# Patient Record
Sex: MALE | Race: OTHER
[De-identification: names, ages, dates, MRNs, and addresses within clinical notes are randomized per-mention and may not be internally consistent; named-entity substitution may affect disease eponyms.]

---

## 2017-05-11 ENCOUNTER — APPOINTMENT (OUTPATIENT)
Dept: OTOLARYNGOLOGY | Facility: CLINIC | Age: 47
End: 2017-05-11

## 2017-05-18 ENCOUNTER — APPOINTMENT (OUTPATIENT)
Dept: OTOLARYNGOLOGY | Facility: CLINIC | Age: 47
End: 2017-05-18

## 2017-05-22 ENCOUNTER — EMERGENCY (EMERGENCY)
Facility: HOSPITAL | Age: 47
LOS: 0 days | Discharge: HOME | End: 2017-05-22

## 2017-05-22 PROBLEM — Z00.00 ENCOUNTER FOR PREVENTIVE HEALTH EXAMINATION: Noted: 2017-05-22

## 2017-06-15 ENCOUNTER — APPOINTMENT (OUTPATIENT)
Dept: OTOLARYNGOLOGY | Facility: CLINIC | Age: 47
End: 2017-06-15

## 2017-06-15 VITALS — HEIGHT: 67 IN | BODY MASS INDEX: 25.9 KG/M2 | WEIGHT: 165 LBS

## 2017-06-15 DIAGNOSIS — Z78.9 OTHER SPECIFIED HEALTH STATUS: ICD-10-CM

## 2017-06-28 DIAGNOSIS — R19.7 DIARRHEA, UNSPECIFIED: ICD-10-CM

## 2018-02-25 ENCOUNTER — EMERGENCY (EMERGENCY)
Facility: HOSPITAL | Age: 48
LOS: 0 days | Discharge: HOME | End: 2018-02-25

## 2018-02-25 VITALS
SYSTOLIC BLOOD PRESSURE: 117 MMHG | OXYGEN SATURATION: 98 % | DIASTOLIC BLOOD PRESSURE: 61 MMHG | TEMPERATURE: 99 F | RESPIRATION RATE: 18 BRPM

## 2018-02-25 DIAGNOSIS — M25.562 PAIN IN LEFT KNEE: ICD-10-CM

## 2018-02-25 DIAGNOSIS — M25.531 PAIN IN RIGHT WRIST: ICD-10-CM

## 2018-02-25 DIAGNOSIS — G89.29 OTHER CHRONIC PAIN: ICD-10-CM

## 2018-02-25 DIAGNOSIS — M25.532 PAIN IN LEFT WRIST: ICD-10-CM

## 2018-02-25 NOTE — ED PROVIDER NOTE - OBJECTIVE STATEMENT
46yo male with no significant PMHx presents c/o bilateral wrist pain and L knee for months. 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 He also admits to L knee pain that has been progressively worsening over months and occasionally "gives out" on him while working. He states to similar pain years prior for which he was undergoing PT but has finished. He has had no further evaluation since. He has not tried any OTC meds.

## 2018-02-25 NOTE — ED PROVIDER NOTE - NS ED ROS FT
CONST: No fever, chills or bodyaches  CARD: No chest pain, palpitations  MS: see HPI  SKIN: No rashes  NEURO: No headache, dizziness, paresthesias or LOC

## 2018-02-25 NOTE — ED PROVIDER NOTE - MEDICAL DECISION MAKING DETAILS
I have discussed the discharge plan with the patient. The patient agrees with the plan, as discussed.  The patient understands Emergency Department diagnosis is a preliminary diagnosis often based on limited information and that the patient must adhere to the follow-up plan as discussed.  The patient understands that if the symptoms worsen or if prescribed medications do not have the desired/planned effect that the patient may return to the Emergency Department at any time for further evaluation and treatment.     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 Pain free in ED. L knee pain, also chronic, previously benefitted from PT however no longer goes. Recently knee has been giving out when working. Rec: NSAIDS, RICE after work and ortho f/u.

## 2018-02-25 NOTE — ED PROVIDER NOTE - PHYSICAL EXAMINATION
CONST: Well appearing in NAD  EYES: PERRL, EOMI, Sclera and conjunctiva clear.   ENT: No nasal discharge.  Oropharynx normal appearing, no erythema or exudates.   NECK: Non-tender, no meningeal signs  CARD: Normal S1 S2; Normal rate and rhythm  RESP: Equal BS B/L, No wheezes, rhonchi or rales. No distress  MS:  Negative bony tenderness for wrists. No swelling. Full flexion/extension. L knee with mild medial aspect tenderness. No laxity. FROM  SKIN: Warm, dry, no acute rashes. Good turgor  NEURO: A&Ox3, No focal deficits.

## 2018-03-07 ENCOUNTER — OUTPATIENT (OUTPATIENT)
Dept: OUTPATIENT SERVICES | Facility: HOSPITAL | Age: 48
LOS: 1 days | Discharge: HOME | End: 2018-03-07

## 2018-03-07 ENCOUNTER — APPOINTMENT (OUTPATIENT)
Dept: ORTHOPEDIC SURGERY | Facility: CLINIC | Age: 48
End: 2018-03-07

## 2018-03-22 DIAGNOSIS — M25.569 PAIN IN UNSPECIFIED KNEE: ICD-10-CM

## 2018-03-23 ENCOUNTER — OUTPATIENT (OUTPATIENT)
Dept: OUTPATIENT SERVICES | Facility: HOSPITAL | Age: 48
LOS: 1 days | Discharge: HOME | End: 2018-03-23

## 2018-04-11 ENCOUNTER — APPOINTMENT (OUTPATIENT)
Dept: INTERNAL MEDICINE | Facility: CLINIC | Age: 48
End: 2018-04-11

## 2018-04-23 ENCOUNTER — OUTPATIENT (OUTPATIENT)
Dept: OUTPATIENT SERVICES | Facility: HOSPITAL | Age: 48
LOS: 1 days | Discharge: HOME | End: 2018-04-23

## 2018-04-23 ENCOUNTER — APPOINTMENT (OUTPATIENT)
Dept: INTERNAL MEDICINE | Facility: CLINIC | Age: 48
End: 2018-04-23

## 2018-04-23 VITALS
HEIGHT: 67 IN | BODY MASS INDEX: 25.43 KG/M2 | SYSTOLIC BLOOD PRESSURE: 100 MMHG | DIASTOLIC BLOOD PRESSURE: 67 MMHG | WEIGHT: 162 LBS | HEART RATE: 76 BPM

## 2018-04-23 DIAGNOSIS — Z87.39 PERSONAL HISTORY OF OTHER DISEASES OF THE MUSCULOSKELETAL SYSTEM AND CONNECTIVE TISSUE: ICD-10-CM

## 2018-04-23 DIAGNOSIS — H92.03 OTALGIA, BILATERAL: ICD-10-CM

## 2018-04-23 DIAGNOSIS — M25.561 PAIN IN RIGHT KNEE: ICD-10-CM

## 2018-04-23 DIAGNOSIS — M25.562 PAIN IN RIGHT KNEE: ICD-10-CM

## 2018-04-23 DIAGNOSIS — H91.93 UNSPECIFIED HEARING LOSS, BILATERAL: ICD-10-CM

## 2018-04-26 DIAGNOSIS — Z23 ENCOUNTER FOR IMMUNIZATION: ICD-10-CM

## 2018-04-26 DIAGNOSIS — M25.562 PAIN IN LEFT KNEE: ICD-10-CM

## 2018-05-16 ENCOUNTER — APPOINTMENT (OUTPATIENT)
Dept: ORTHOPEDIC SURGERY | Facility: CLINIC | Age: 48
End: 2018-05-16

## 2018-06-06 DIAGNOSIS — M25.561 PAIN IN RIGHT KNEE: ICD-10-CM

## 2018-06-06 DIAGNOSIS — M15.0 PRIMARY GENERALIZED (OSTEO)ARTHRITIS: ICD-10-CM

## 2018-10-14 ENCOUNTER — EMERGENCY (EMERGENCY)
Facility: HOSPITAL | Age: 48
LOS: 0 days | Discharge: HOME | End: 2018-10-14
Admitting: PHYSICIAN ASSISTANT

## 2018-10-14 VITALS
OXYGEN SATURATION: 98 % | TEMPERATURE: 98 F | DIASTOLIC BLOOD PRESSURE: 75 MMHG | RESPIRATION RATE: 18 BRPM | SYSTOLIC BLOOD PRESSURE: 142 MMHG | HEART RATE: 72 BPM

## 2018-10-14 DIAGNOSIS — Z79.899 OTHER LONG TERM (CURRENT) DRUG THERAPY: ICD-10-CM

## 2018-10-14 DIAGNOSIS — S69.92XA UNSPECIFIED INJURY OF LEFT WRIST, HAND AND FINGER(S), INITIAL ENCOUNTER: ICD-10-CM

## 2018-10-14 DIAGNOSIS — M25.532 PAIN IN LEFT WRIST: ICD-10-CM
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
## 2018-10-14 RX ORDER — IBUPROFEN 200 MG
600 TABLET ORAL ONCE
Qty: 0 | Refills: 0 | Status: COMPLETED | OUTPATIENT
Start: 2018-10-14 | End: 2018-10-14

## 2018-10-14 RX ADMIN — Medication 600 MILLIGRAM(S): at 18:23

## 2018-10-14 NOTE — ED PROVIDER NOTE - MUSCULOSKELETAL, MLM
no obvious deformity to left wrist ; + tenderness along left distal radius with full ROM exhibited ; no tenderness along hand and elbow

## 2018-10-14 NOTE — ED ADULT NURSE NOTE - OBJECTIVE STATEMENT
patient c/o left wrist pain for one month, worse this past week. denies any trauma to the area. no redness/swelling noted

## 2018-10-14 NOTE — ED PROVIDER NOTE - OBJECTIVE STATEMENT
49 y/o M, no significant PMHx, presents to the ED with complaints of left wrist pain x one month. Patient was playing soccer one month ago when he fell on to his left wrist with resultant pain that has been intermittent. He denies associated swelling, decreased ROM, fever, chills, and additional injuries.

## 2018-10-15 ENCOUNTER — APPOINTMENT (OUTPATIENT)
Dept: INTERNAL MEDICINE | Facility: CLINIC | Age: 48
End: 2018-10-15

## 2018-10-18 ENCOUNTER — LABORATORY RESULT (OUTPATIENT)
Age: 48
End: 2018-10-18

## 2018-10-18 LAB
ALBUMIN SERPL ELPH-MCNC: 4.6 G/DL
ALP BLD-CCNC: 78 U/L
ALT SERPL-CCNC: 27 U/L
ANION GAP SERPL CALC-SCNC: 15 MMOL/L
AST SERPL-CCNC: 24 U/L
BASOPHILS # BLD AUTO: 0.03 K/UL
BASOPHILS NFR BLD AUTO: 0.6 %
BILIRUB SERPL-MCNC: 0.7 MG/DL
BUN SERPL-MCNC: 14 MG/DL
CALCIUM SERPL-MCNC: 9.1 MG/DL
CHLORIDE SERPL-SCNC: 101 MMOL/L
CHOLEST SERPL-MCNC: 240 MG/DL
CHOLEST/HDLC SERPL: 4.4 RATIO
CO2 SERPL-SCNC: 23 MMOL/L
CREAT SERPL-MCNC: 0.6 MG/DL
EOSINOPHIL # BLD AUTO: 0.22 K/UL
EOSINOPHIL NFR BLD AUTO: 4.4 %
GLUCOSE SERPL-MCNC: 94 MG/DL
HCT VFR BLD CALC: 45.2 %
HDLC SERPL-MCNC: 55 MG/DL
HGB BLD-MCNC: 15.4 G/DL
IMM GRANULOCYTES NFR BLD AUTO: 0.4 %
LDLC SERPL CALC-MCNC: 189 MG/DL
LYMPHOCYTES # BLD AUTO: 1.55 K/UL
LYMPHOCYTES NFR BLD AUTO: 31.2 %
MAN DIFF?: NORMAL
MCHC RBC-ENTMCNC: 29.4 PG
MCHC RBC-ENTMCNC: 34.1 G/DL
MCV RBC AUTO: 86.3 FL
MONOCYTES # BLD AUTO: 0.3 K/UL
MONOCYTES NFR BLD AUTO: 6 %
NEUTROPHILS # BLD AUTO: 2.85 K/UL
NEUTROPHILS NFR BLD AUTO: 57.4 %
PLATELET # BLD AUTO: 232 K/UL
POTASSIUM SERPL-SCNC: 4.3 MMOL/L
PROT SERPL-MCNC: 7.3 G/DL
RBC # BLD: 5.24 M/UL
RBC # FLD: 12.1 %
SODIUM SERPL-SCNC: 139 MMOL/L
TRIGL SERPL-MCNC: 114 MG/DL
WBC # FLD AUTO: 4.97 K/UL

## 2018-11-28 ENCOUNTER — APPOINTMENT (OUTPATIENT)
Dept: ORTHOPEDIC SURGERY | Facility: CLINIC | Age: 48
End: 2018-11-28

## 2018-11-28 ENCOUNTER — OUTPATIENT (OUTPATIENT)
Dept: OUTPATIENT SERVICES | Facility: HOSPITAL | Age: 48
LOS: 1 days | Discharge: HOME | End: 2018-11-28

## 2019-12-30 ENCOUNTER — EMERGENCY (EMERGENCY)
Facility: HOSPITAL | Age: 49
LOS: 0 days | Discharge: HOME | End: 2019-12-30
Attending: EMERGENCY MEDICINE | Admitting: EMERGENCY MEDICINE
Payer: SUBSIDIZED

## 2019-12-30 VITALS
HEART RATE: 85 BPM | TEMPERATURE: 99 F | OXYGEN SATURATION: 98 % | RESPIRATION RATE: 18 BRPM | DIASTOLIC BLOOD PRESSURE: 76 MMHG | SYSTOLIC BLOOD PRESSURE: 135 MMHG

## 2019-12-30 DIAGNOSIS — J06.9 ACUTE UPPER RESPIRATORY INFECTION, UNSPECIFIED: ICD-10-CM

## 2019-12-30 DIAGNOSIS — R05 COUGH: ICD-10-CM

## 2019-12-30 PROCEDURE — 99283 EMERGENCY DEPT VISIT LOW MDM: CPT

## 2019-12-30 PROCEDURE — 71046 X-RAY EXAM CHEST 2 VIEWS: CPT | Mod: 26

## 2019-12-30 NOTE — ED PROVIDER NOTE - PATIENT PORTAL LINK FT
You can access the FollowMyHealth Patient Portal offered by [facility] by registering at the following website: [email or web address]. By joining [employer]’s FollowMyHealth portal, you will also be able to view your health information using other applications (apps) compatible with our system.

## 2019-12-30 NOTE — ED PROVIDER NOTE - ATTENDING CONTRIBUTION TO CARE
Pt here for sore throat and cough.  Subjective fever.  No vomiting/diarhrea/myalgias.    Exam: normal pharynx, no LAD, CTAB, RRR, NAD  Plan: xr chest

## 2019-12-30 NOTE — ED PROVIDER NOTE - PHYSICAL EXAMINATION
CONSTITUTIONAL: Well-developed; well-nourished; in no acute distress.   SKIN: warm, dry  HEAD: Normocephalic; atraumatic.  EYES: no conjunctival injection.   ENT: No nasal discharge; airway clear.  NECK: Supple; non tender.  CARD: S1, S2 normal; no murmurs, gallops, or rubs. Regular rate and rhythm.   RESP: No wheezes, rales or rhonchi.  ABD: soft ntnd  EXT: Normal ROM.  No clubbing, cyanosis or edema.   LYMPH: No acute cervical adenopathy.  NEURO: Alert, oriented, grossly unremarkable  PSYCH: Cooperative, appropriate.

## 2019-12-30 NOTE — ED PROVIDER NOTE - OBJECTIVE STATEMENT
49y m no pmh presenting with URI symptoms x2 weeks. +SOB +cough +congestion +subjective fevers x1 week. No abdominal pain. No chest pain. Not a smoker. No leg swelling. No recent immobility/surgeries.

## 2019-12-30 NOTE — ED ADULT NURSE NOTE - NS ED NURSE DISCH DISPOSITION
[facility]    11/29/2017 10:31 AM    Max heart rate: 120 bpm: 68%    48 year old    Wt Readings from Last 1 Encounters:   11/29/17 108.9 kg      Ht Readings from Last 1 Encounters:   11/29/17 5' 5\" (1.651 m)       Patient Type: Outpatient    Cardiac Markers: Not Applicable    Reason for test: chest pain, atypical     Primary MD: Mary benitez   Surgeon:    Ordering MD:  Fabian Lobato   Other:     Cardiologist Performing Test: Fabian Lobato    --------------------------------------------------------------------------------------------------------------------  HISTORY OF: Smoker       PATIENT HAS HAD THE FOLLOWING IN THE LAST 24 HOURS:  medication .    Current Outpatient Prescriptions   Medication Sig Dispense Refill   • omeprazole (PRILOSEC) 40 MG capsule Take 1 capsule by mouth daily. 30 capsule 11   • varenicline (CHANTIX STARTING MONTH MIMI) 0.5 MG X 11 & 1 MG X 42 tablet Take medication as directed on package 53 tablet 0   • varenicline (CHANTIX CONTINUING MONTH MIMI) 1 MG tablet Take 1 tablet by mouth 2 times daily. 60 tablet 6   • Multiple Vitamins-Minerals (MULTI COMPLETE PO)      • Melatonin-Pyridoxine (MELATONEX PO) Take 5 mg by mouth.        No current facility-administered medications for this encounter.        ALLERGIES:   Allergen Reactions   • Penicillins Other (See Comments)     Yeast infection       MEETS CRITERIA FOR STRESS TEST: Yes         NOTES:     TYPE OF TEST: Myocardial Perfusion and Lexiscan    ABLE TO WALK: Yes     NEEDED: No  
Discharged

## 2019-12-30 NOTE — ED PROVIDER NOTE - NS ED ROS FT
Eyes:  No visual changes, eye pain or discharge.  ENMT:  No hearing changes, pain, no sore throat +runny nose  Cardiac:  No chest pain, SOB or edema. No chest pain with exertion.  Respiratory: +cough  GI:  No nausea, vomiting, diarrhea or abdominal pain.  :  No dysuria, frequency or burning.  MS:  +myalgia  Neuro:  No headache or weakness.  No LOC.  Skin:  No skin rash.   Endocrine: No history of thyroid disease or diabetes.

## 2020-03-05 ENCOUNTER — EMERGENCY (EMERGENCY)
Facility: HOSPITAL | Age: 50
LOS: 0 days | Discharge: HOME | End: 2020-03-05
Admitting: EMERGENCY MEDICINE
Payer: SUBSIDIZED

## 2020-03-05 VITALS
DIASTOLIC BLOOD PRESSURE: 65 MMHG | HEART RATE: 75 BPM | OXYGEN SATURATION: 99 % | RESPIRATION RATE: 20 BRPM | TEMPERATURE: 99 F | SYSTOLIC BLOOD PRESSURE: 129 MMHG

## 2020-03-05 VITALS
SYSTOLIC BLOOD PRESSURE: 121 MMHG | TEMPERATURE: 99 F | DIASTOLIC BLOOD PRESSURE: 60 MMHG | OXYGEN SATURATION: 99 % | RESPIRATION RATE: 18 BRPM

## 2020-03-05 DIAGNOSIS — J18.9 PNEUMONIA, UNSPECIFIED ORGANISM: ICD-10-CM

## 2020-03-05 DIAGNOSIS — R05 COUGH: ICD-10-CM

## 2020-03-05 PROCEDURE — 71046 X-RAY EXAM CHEST 2 VIEWS: CPT | Mod: 26

## 2020-03-05 PROCEDURE — 99284 EMERGENCY DEPT VISIT MOD MDM: CPT

## 2020-03-05 RX ORDER — KETOROLAC TROMETHAMINE 30 MG/ML
30 SYRINGE (ML) INJECTION ONCE
Refills: 0 | Status: DISCONTINUED | OUTPATIENT
Start: 2020-03-05 | End: 2020-03-05

## 2020-03-05 RX ORDER — AZITHROMYCIN 500 MG/1
1 TABLET, FILM COATED ORAL
Qty: 6 | Refills: 0
Start: 2020-03-05 | End: 2020-03-09

## 2020-03-05 RX ADMIN — Medication 30 MILLIGRAM(S): at 21:06

## 2020-03-05 NOTE — ED PROVIDER NOTE - PATIENT PORTAL LINK FT
You can access the FollowMyHealth Patient Portal offered by [facility] by registering at the following website: [email or web address]. By joining [employer]’s FollowMyHealth portal, you will also be able to view your health information using other applications (apps) compatible with our system.

## 2020-03-05 NOTE — ED PROVIDER NOTE - NS ED ROS FT
Constitutional: no chills, no recent weight loss, change in appetite   Eyes: no redness/discharge/pain/vision changes  ENT: no rhinorrhea/ear pain/sore throat  Cardiac: No chest pain, SOB or edema.  Respiratory: No respiratory distress  GI: No nausea, vomiting, diarrhea or abdominal pain.  : No dysuria, frequency, urgency or hematuria  MS: no pain to back or extremities, no loss of ROM, no weakness  Neuro: No headache or weakness. No LOC.  Skin: No skin rash.  Endocrine: No history of thyroid disease or diabetes.  Except as documented in the HPI, all other systems are negative.

## 2020-03-05 NOTE — ED PROVIDER NOTE - PROGRESS NOTE DETAILS
Risk of worsening pneumonia d/w pt.   Pt looks well.  Abx choice considered given pt's history. Speaking in full sentences.  Vitals noted, sepsis not supported.  Does not currently appear to need admission. Abx risk dw pt. Pt aware needs f/u with PCP. Pt aware needs ER if worse.

## 2020-03-05 NOTE — ED PROVIDER NOTE - NSFOLLOWUPCLINICS_GEN_ALL_ED_FT
[facility]  Medicine  [address], [place]   Phone: [phone]  Fax:   Follow Up Time:

## 2020-03-05 NOTE — ED PROVIDER NOTE - OBJECTIVE STATEMENT
pt c/o cough, fever, body aches for 3-4 days. cough productive of green sputum. denies sick contacts or recent travel. pt c/o cough, fever, body aches for 3-4 days. cough productive of green sputum. denies sick contacts or recent travel. Denies chill/HA/dizziness/chest pain/palpitation/sob/abd pain/n/v/d/ black stool/bloody stool/urinary sxs

## 2020-05-14 ENCOUNTER — EMERGENCY (EMERGENCY)
Facility: HOSPITAL | Age: 50
LOS: 0 days | Discharge: HOME | End: 2020-05-14
Attending: EMERGENCY MEDICINE | Admitting: EMERGENCY MEDICINE
Payer: SUBSIDIZED

## 2020-05-14 VITALS
OXYGEN SATURATION: 98 % | TEMPERATURE: 98 F | HEART RATE: 75 BPM | SYSTOLIC BLOOD PRESSURE: 142 MMHG | DIASTOLIC BLOOD PRESSURE: 60 MMHG | RESPIRATION RATE: 18 BRPM

## 2020-05-14 DIAGNOSIS — M25.562 PAIN IN LEFT KNEE: ICD-10-CM

## 2020-05-14 DIAGNOSIS — M25.569 PAIN IN UNSPECIFIED KNEE: ICD-10-CM

## 2020-05-14 PROCEDURE — 99283 EMERGENCY DEPT VISIT LOW MDM: CPT

## 2020-05-14 PROCEDURE — 73562 X-RAY EXAM OF KNEE 3: CPT | Mod: 26,LT

## 2020-05-14 NOTE — ED PROVIDER NOTE - PHYSICAL EXAMINATION
CONST: Well appearing in NAD  EYES: PERRL, EOMI, Sclera and conjunctiva clear.   CARD: Normal S1 S2; Normal rate and rhythm  RESP: Equal BS B/L, No wheezes, rhonchi or rales. No distress  MS: Tenderness to L medial knee, pain elicited upon flexion. No swelling or erythema.   SKIN: Warm, dry, no acute rashes. Good turgor  NEURO: A&Ox3, No focal deficits. Strength 5/5 with no sensory deficits. Steady gait CONST: Well appearing in NAD  EYES: PERRL, EOMI, Sclera and conjunctiva clear.   CARD: Normal S1 S2; Normal rate and rhythm  RESP: Equal BS B/L, No wheezes, rhonchi or rales. No distress  MS: Tenderness and mild swelling to L medial knee, pain elicited upon flexion. No warmth  or erythema.   SKIN: Warm, dry, no acute rashes. Good turgor  NEURO: A&Ox3, No focal deficits. Strength 5/5 with no sensory deficits. Steady gait

## 2020-05-14 NOTE — ED PROVIDER NOTE - PATIENT PORTAL LINK FT
You can access the FollowMyHealth Patient Portal offered by [facility] by registering at the following website: [email or web address]. By joining [employer]’s FollowMyHealth portal, you will also be able to view your health information using other applications (apps) compatible with our system.

## 2020-05-14 NOTE — ED PROVIDER NOTE - CARE PROVIDER_API CALL
Darvin Weber  ORTHOPAEDIC SURGERY  [address] [place] [address]  Phone: [phone]  Fax: [phone]  Follow Up Time:

## 2020-05-14 NOTE — ED PROVIDER NOTE - NS ED ROS FT
CONST: No fever, chills or bodyaches  CARD: No chest pain, palpitations  RESP: No SOB, cough  MS: L knee pain  SKIN: No rashes  NEURO: No headache, dizziness, paresthesias or LOC

## 2020-05-14 NOTE — ED PROVIDER NOTE - NSFOLLOWUPINSTRUCTIONS_ED_ALL_ED_FT
[facility] - General Information     Dolor de rodilla    LO QUE NECESITA SABER:    ¿Qué necesito saber acerca del dolor de rodilla?El dolor de rodilla puede empezar de forma repentina, o ser un problema a bernardino plazo. Puede sentir dolor en la parte lateral, delantera o trasera de la rodilla. Puede tener la rodilla rígida e hinchada. Puede oír sonidos de chasquido o sentir que la rodilla cede o se le bloquea al andar. Puede sentir dolor cuando se sienta, se pone de pie, camina o sube y baja escaleras.    ¿Qué aumenta mi riesgo de tener dolor de rodilla?    Obesidad      Un esguince o desgarro en los ligamentos o tendones      Marshal fractura de la pierna o dislocación de la rodilla      Uso excesivo de la rodilla      Osteoartritis, artritis reumatoide o gota      Marshal infección, tumor o quiste en la rodilla      Zapatos que no brindan soporte o entrenamiento sobre marshal superficie dura      Deportes que impliquen saltar o girar sobre la rodilla    ¿Cómo se diagnostica la causa del dolor de rodilla?Bo médico examinará bo rodilla y le preguntará sobre kathy síntomas. Informe a bo médico cuándo comenzó el dolor y qué estaba haciendo en el momento. Describa el dolor niecy dean, pulsátil o adolorido. Informe a bo médico sobre cualquier lesión o cirugía que haya tenido en la rodilla. Es posible que usted necesite alguno de los siguientes:     Marshal resonancia magnética (RM), tomografía computarizada (TC) o un ultrasonidopodrían mostrar imágenes de marshal lesión, fractura o tumor.      Los análisis de sangrepodrían usarse para medir la cantidad de inflamación en bo edwardo. Los exámenes también podrían mostrar signos de infección.      Marshal artroscopiaes un procedimiento para observar el interior de la articulación de bo rodilla con un artroscopio. Un artroscopio es un tubo flexible que tiene marshal sanjeev y marshal cámara en el extremo. Marshal artroscopia de rodilla por lo general se realiza para determinar la presencia de marshal enfermedad o daño en el interior de bo rodilla. Estos problemas se pueden arreglar analia el procedimiento.    ¿Cómo se trata el dolor de rodilla?El tratamiento dependerá de la causa del dolor. Es posible que usted necesite alguno de los siguientes:     Los VERITO,pueden disminuir la inflamación y el dolor o la fiebre. Hannah medicamento está disponible con o sin marshal receta médica. Los VERITO pueden causar sangrado estomacal o problemas renales en ciertas personas. Si usted juan un medicamento anticoagulante, siempre pregúntele a bo médico si los VERITO son seguros para usted. Siempre marci la etiqueta de hannah medicamento y siga las instrucciones.      Acetaminofénalivia el dolor y baja la fiebre. Está disponible sin receta médica. Pregunte la cantidad y la frecuencia con que debe tomarlos. Siga las indicaciones. Marci las etiquetas de todos los demás medicamentos que esté usando para saber si también contienen acetaminofén, o pregunte a bo médico o farmacéutico. El acetaminofén puede causar daño en el hígado cuando no se juan de forma correcta. No use más de 4 gramos (4000 miligramos) en total de acetaminofeno en un día.      Puede administrarsepodrían administrarse. Pregunte al médico cómo debe grisel hannah medicamento de forma walden. Algunos medicamentos recetados para el dolor contienen acetaminofén. No tome otros medicamentos que contengan acetaminofén sin consultarlo con bo médico. Demasiado acetaminofeno puede causar daño al hígado. Los medicamentos recetados para el dolor podrían causar estreñimiento. Pregunte a bo médico niecy prevenir o tratar estreñimiento.      Las inyecciones de esteroidespodrían administrarse en la rodilla. Los esteroides reducen la inflamación y el dolor.      La cirugíapuede usarse para algunas lesiones, niecy para la reparación del desgarro del ligamento pablo anterior (LCA).    ¿Qué puedo hacer para manejar mis síntomas?    Repose la rodilla para que se pueda curar.Limite las actividades que aumenten el dolor. Rubens ejercicios de bajo impacto, niecy caminar o nadar.      Aplique hielo para ayudar a disminuir la inflamación y el dolor.Use marshal compresa de hielo o ponga hielo triturado en marshal bolsa de plástico. Cúbrala con marshal toalla antes de aplicarla sobre la herida. Aplique hielo analia 15 a 20 minutos por hora o según indicaciones.      Aplique compresión para ayudar a reducir la inflamación.Use marshal férula o venda solamente si sigue las instrucciones del nu.      Eleve la rodilla para ayudar a disminuir el dolor y la inflamación.Eleve la rodilla mientras esté sentado o acostado. Apoye la pierna sobre almohadones para mantener la rodilla por encima del corazón.      Evite que la rodilla se mueva, niecy se le haya indicado.Bo médico podría ponerle un yeso o férula. Usted podría necesitar de un yeso en bo pierna para estabilizar bo rodilla. El yeso en la pierna puede ajustarse para aumentar bo rango de movimiento a medida que bo rodilla harriet.Rodillera articulada         ¿Qué puedo hacer para prevenir el dolor de rodilla?    Mantenga un peso saludable.El exceso de peso aumenta bo riesgo de sufrir dolor de rodilla. Consulte con bo médico cuánto debería pesar. Él puede ayudarlo a crear un plan de pérdida de peso seguro si usted tiene sobrepeso.      Rubens ejercicio o entrene correctamente.Utilice los aparatos adecuados para los deportes. Use zapatos que brinden un buen soporte. Verifique bo postura a menudo mientras hace ejercicio, juega deportes o entrena para un evento. [place] puede ayudar a prevenir el estrés y la tensión en las rodillas. Descanse entre sesiones para no esforzar excesivamente las rodillas.    ¿Cuándo falguni buscar atención inmediata?    El dolor empeora, incluso después del tratamiento.      No puede flexionar o enderezar la pierna completamente.      La hinchazón alrededor de la rodilla no disminuye a pesar del tratamiento.      La rodilla le duele y se nota caliente al tacto.    ¿Cuándo falguni comunicarme con mi médico?    Usted tiene preguntas o inquietudes acerca de bo condición o cuidado.        ACUERDOS SOBRE BO CUIDADO:    Usted tiene el derecho de ayudar a planear bo cuidado. Aprenda todo lo que pueda sobre bo condición y niecy darle tratamiento. Discuta kathy opciones de tratamiento con kathy médicos para decidir el cuidado que usted desea recibir. Usted siempre tiene el derecho de rechazar el tratamiento.

## 2020-05-14 NOTE — ED PROVIDER NOTE - OBJECTIVE STATEMENT
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 Denies swelling, redness, warmth, fever, chills.

## 2020-12-29 ENCOUNTER — EMERGENCY (EMERGENCY)
Facility: HOSPITAL | Age: 50
LOS: 0 days | Discharge: HOME | End: 2020-12-29
Attending: STUDENT IN AN ORGANIZED HEALTH CARE EDUCATION/TRAINING PROGRAM | Admitting: STUDENT IN AN ORGANIZED HEALTH CARE EDUCATION/TRAINING PROGRAM
Payer: MEDICAID

## 2020-12-29 VITALS
TEMPERATURE: 99 F | RESPIRATION RATE: 18 BRPM | HEART RATE: 80 BPM | SYSTOLIC BLOOD PRESSURE: 118 MMHG | OXYGEN SATURATION: 98 % | DIASTOLIC BLOOD PRESSURE: 62 MMHG

## 2020-12-29 VITALS
DIASTOLIC BLOOD PRESSURE: 69 MMHG | HEART RATE: 75 BPM | OXYGEN SATURATION: 99 % | TEMPERATURE: 98 F | RESPIRATION RATE: 18 BRPM | SYSTOLIC BLOOD PRESSURE: 114 MMHG

## 2020-12-29 DIAGNOSIS — R09.89 OTHER SPECIFIED SYMPTOMS AND SIGNS INVOLVING THE CIRCULATORY AND RESPIRATORY SYSTEMS: ICD-10-CM

## 2020-12-29 DIAGNOSIS — Z20.828 CONTACT WITH AND (SUSPECTED) EXPOSURE TO OTHER VIRAL COMMUNICABLE DISEASES: ICD-10-CM

## 2020-12-29 DIAGNOSIS — J02.9 ACUTE PHARYNGITIS, UNSPECIFIED: ICD-10-CM

## 2020-12-29 DIAGNOSIS — Z79.899 OTHER LONG TERM (CURRENT) DRUG THERAPY: ICD-10-CM

## 2020-12-29 DIAGNOSIS — M79.10 MYALGIA, UNSPECIFIED SITE: ICD-10-CM

## 2020-12-29 DIAGNOSIS — R05 COUGH: ICD-10-CM

## 2020-12-29 PROCEDURE — 99284 EMERGENCY DEPT VISIT MOD MDM: CPT

## 2020-12-29 PROCEDURE — 71045 X-RAY EXAM CHEST 1 VIEW: CPT | Mod: 26

## 2020-12-29 RX ORDER — DEXAMETHASONE 0.5 MG/5ML
6 ELIXIR ORAL ONCE
Refills: 0 | Status: COMPLETED | OUTPATIENT
Start: 2020-12-29 | End: 2020-12-29

## 2020-12-29 RX ADMIN — Medication 6 MILLIGRAM(S): at 17:44

## 2020-12-29 NOTE — ED PROVIDER NOTE - CLINICAL SUMMARY MEDICAL DECISION MAKING FREE TEXT BOX
49 y/o M no PMHx presents here c/o x3 days of throat pain, HA, myalgias, cough, and subjective fevers. VS reviewed. labs imaging obtained and reviewed. No acute infiltrate, concern for possible covid 19 infection, PCR sent. Throat demonstrates no exudates, decadron provided for pain. Patient a spoken to in detail about results  All questions addressed. Return precautions given. Medicine clinic provided for follow up

## 2020-12-29 NOTE — ED PROVIDER NOTE - PHYSICAL EXAMINATION
CONSTITUTIONAL: Well-appearing; well-nourished; in no apparent distress.   ENT: + rhinorrhea; normal pharynx with no tonsillar hypertrophy.   NECK: Supple; non-tender; no cervical lymphadenopathy.   CARDIOVASCULAR: Normal S1, S2; no murmurs, rubs, or gallops.   RESPIRATORY: Normal chest excursion with respiration; breath sounds clear and equal bilaterally; no wheezes, rhonchi, or rales.  NEURO/PSYCH: A & O x 4; grossly unremarkable.

## 2020-12-29 NOTE — ED ADULT NURSE NOTE - CAS DISCH TRANSFER METHOD
well nourished/pt agreeable for nutrition focused physical exam, mild to moderate muscle loss around her temples, otherwise no other areas of muscle or fat loss at this time, pt overall thin legs Private car

## 2020-12-29 NOTE — ED PROVIDER NOTE - NSFOLLOWUPCLINICS_GEN_ALL_ED_FT
[facility]  Medicine  [address] [place], [place]   Phone: [phone]  Fax:   Follow Up Time:

## 2020-12-29 NOTE — ED PROVIDER NOTE - PROGRESS NOTE DETAILS
suspect covid19 infection; Pt looks well. Speaking in full sentences.  Vitals noted, sepsis not supported.  Does not currently appear to need admission. Pt aware needs f/u with PCP. Pt aware needs ER if worse. rev'd self quarantine instructions with pt Attending Note: 49 y/o M no PMHx presents here c/o x3 days of throat pain, HA, myalgias, cough, and subjective fevers. No n/v, abd pain, or SOB. Pt is a nonsmoker. PE: CONSTITUTIONAL: WA / WN / NAD. HEAD: NCAT. EYES: PERRL; EOMI; anicteric. ENT: Normal pharynx; mucous membranes pink/moist, no erythema. NECK: Supple; no meningeal signs CARD: RRR; nl S1/S2; no M/R/G. Pulses equal bilaterally. RESP: Respiratory rate and effort are normal; breath sounds clear and equal bilaterally. ABD: Soft, NT ND nl bowel sounds; no masses; no rebound. MSK/EXT: No gross deformities; full range of motion. SKIN: Warm and dry;  NEURO: AAOx3, PSYCH: Memory Intact, Normal Affect. Pt aware that we will have official radiology read pending, and may result in change of xray read.  Pt voices understanding of use of medications, instructions for care, and reasons to return or to go to ED Attending Note: 49 y/o M no PMHx presents here c/o x3 days of throat pain, HA, myalgias, cough, and subjective fevers. No n/v, abd pain, or SOB. Pt is a nonsmoker. PE: CONSTITUTIONAL: WA / WN / NAD. HEAD: NCAT. EYES: PERRL; EOMI; anicteric. ENT: Normal pharynx; mucous membranes pink/moist, no erythema no exudates. NECK: Supple; no meningeal signs CARD: RRR; nl S1/S2; no M/R/G. Pulses equal bilaterally. RESP: Respiratory rate and effort are normal; breath sounds clear and equal bilaterally. ABD: Soft, NT ND nl bowel sounds; no masses; no rebound. MSK/EXT: No gross deformities; full range of motion. SKIN: Warm and dry;  NEURO: AAOx3, PSYCH: Memory Intact, Normal Affect.

## 2020-12-29 NOTE — ED PROVIDER NOTE - NSFOLLOWUPINSTRUCTIONS_ED_ALL_ED_FT
[place] coronavirus (COVID-19)  Los hechos  ¿Qué es un coronavirus?  Los coronavirus son marshal gran genny de virus que causan enfermedades que van desde el resfriado común  a enfermedades más severas niecy el Síndrome Respiratorio del Medio Oriente (MERS) y el [place] Severo  Síndrome Respiratorio (SARS).  ¿Qué es el nuevo coronavirus (COVID-19)?  COVID-19 es marshal nueva cepa de coronavirus que no se ha identificado previamente en humanos. COVID-19  fue identificado en la ciudad de [facility], en diciembre de 2019 (COVID-19). COVID-19 tiene  desde que se identificó fuera de [place], en un número creciente de países a nivel internacional, incluidos  los [place].  ¿Dónde puedo encontrar la información más reciente sobre COVID-19?  Los Centros para el Control y la Prevención de Enfermedades ([facility]) están monitoreando de cerca el brote causado por el  COVID-19. Para obtener la información más reciente sobre COVID-19, visite el sitio web de los CDC en  https://www.cdc.gov/coronavirus/index.html  ¿Cómo se transmiten los coronavirus?  Los coronavirus pueden transmitirse de persona a persona, generalmente después de un contacto cercano con marshal persona infectada,  por ejemplo, en un hogar, lugar de trabajo o entorno de atención médica a través de gotas que se transportan al aire después de marshal tos  o estornudar por marshal persona afectada. Estas gotas pueden infectar a marshal persona cercana. Es probable que la transmisión también  ocurre al tocar superficies recientemente contaminadas.  ¿Cuáles son los síntomas de la infección por coronavirus?  Depende del virus, anny los signos comunes incluyen fiebre y / o síntomas respiratorios niecy  tos y dificultad para respirar En casos más severos, la infección puede causar neumonía, aguda aguda  síndrome respiratorio, insuficiencia renal e incluso la muerte. Afortunadamente, la mayoría de los casos de COVID-19 tienen un  enfermedad no diferente a la gripe "gripe". Con la mayoría de estos pacientes con síntomas leves  y mortalidad general que parece no ser muy diferente a la gripe.  ¿Existe un tratamiento para un COVID-19?  No existe un tratamiento específico para la enfermedad causada por COVID-19. Sin embargo, muchos de los síntomas pueden  ser tratado en función de la condición clínica del paciente. La atención de apoyo para personas infectadas puede ser altamente  eficaz.  ¿Qué puedo hacer para protegerme?  Lavarse las aditi, cubrirse la tos y desinfectar las superficies son las mejores precauciones.  medidas. También es aconsejable evitar el contacto cercano con cualquier persona que presente síntomas respiratorios.  enfermedades niecy tos y estornudos. Las personas con síntomas deben usar marshal máscara quirúrgica cuando  alrededor de otros.  ¿Qué puedo hacer para proteger a los que me rodean?  Si ha sido identificado niecy alguien que puede estar infectado con COVID-19, le recomendamos  siga los procedimientos de autoaislamiento que se detallan a continuación para proteger a quienes lo rodean y limitar la propagación  de kelly virus  3 de marzo de 2020  Recomendaciones para pacientes aconsejados para autoaislarse  para marshal posible exposición a COVID-19  Recomendamos los siguientes pasos de precaución desde ahora hasta los 14 angeles desde que  regresó de bo viaje o fecha de bo último contacto posible conocido:  - No vaya al trabajo, la escuela o las áreas públicas. Evite el uso de transporte público, viajes compartidos o  Taxis.  - En la medida de lo posible, separarse de otras personas en bo hogar. Si puedes, deberías  permanecer en marshal habitación y lejos de otras personas en bo hogar. Además, debe usar un  Baño, si está disponible.  - Use la máscara suministrada siempre que esté cerca de otras personas.  - Si tiene marshal zayra médica no urgente, reprograme para marshal fecha posterior. Si el  la zayra es urgente, llame al proveedor de atención médica y dígale que está en autoaislamiento por un posible COVID-19. [place] ayudará a que el consultorio del proveedor de atención médica tome medidas para mantener  otras personas se infecten o se expongan. Si puede reprogramar citas de rutina, rubens  entonces.  - Lávese las aditi con frecuencia con agua y jabón panchito al menos 15 a 20 segundos o lávese las aditi  con un desinfectante para aditi a base de alcohol que contiene 60 a 95% de alcohol, que cubre todas las superficies de  kathy aditi y frotándolas hasta que se sientan secas. Se debe usar agua y jabón  preferentemente si las aditi están visiblemente sucias.  - Cubra bo boca y nariz con un pañuelo cuando tosa o estornude. Tire los pañuelos usados ??en un  cubo de basura forrado; Lávate las aditi inmediatamente.  - Evite tocarse los ojos, la nariz y la boca con las aditi.  - Evite compartir artículos personales del hogar. No debe compartir platos, vasos, vasos,  comer utensilios, toallas o ropa de cama con otras personas o mascotas en bo hogar. Después de usar estos  artículos, se deben yolette a fondo con agua y jabón.  - Limpie y desinfecte todas las superficies de "alto contacto" todos los angeles. Las superficies de alto contacto incluyen mostradores,  tableros de rai, pomos de marta, interruptores de sanjeev, controles remotos, accesorios de baño, inodoros, teléfonos,  teclados, tabletas y mesitas de noche. Además, limpie cualquier superficie que pueda tener edwardo, heces o  fluidos corporales en ellos.    Si desarrolla síntomas que empeoran:  - Si desarrolla síntomas que empeoran, niecy falta de aire severa, isai al [phone] opción # 9. Le ayudarán a determinar kathy próximos pasos.  Panchito bo tiempo de autoaislamiento, rubens lo siguiente:  - Trabaja desde casa si puedes hacerlo.  - Limite el aislamiento social hablando con amigos y familiares por teléfono o con la frantz  - Hable con amigos y familiares que no viven con usted acerca de apoyarse mutuamente si rachel  el hogar debe estar en cuarentena. Por ejemplo, acepte dejar comestibles u otros suministros en el  yung principal.  - Rubens ejercicio y pase tiempo al aire kenneth lejos de los demás si puede hacerlo.    ¿Por qué no me hice la prueba del nuevo coronavirus (COVID-19)?  El número de pruebas disponibles es muy limitado, por lo que existen reglas estrictas para quién puede ser evaluado.  [facility] ha sido autorizado para realizar pruebas y actualmente está trabajando ruddy para ser  capaz de comenzar a proporcionar la prueba. Dichas pruebas están reservadas actualmente para pacientes que martinez tenido  contacto con alguien infectado con el virus, o aquellos que están muy enfermos, además de aquellos que tienen  viajó a áreas identificadas por los Centros para el Control y la Prevención de Enfermedades (CD) y  requieren hospitalización  ¿Qué debería hacer ahora?  Si está lo suficientemente reuben niecy para ser dado de roberta y no está en un kee de alto riesgo para tener  contraído el COVID-10, debe cuidarse en casa exactamente niecy lo haría si  tener gripe "gripe". Siga todas las pautas estándar sobre lavarse las aditi y cubrirse  bo tos, etc.  Debería regresar al departamento de emergencias si presenta síntomas peores, problemas  respiración, dolor en el pecho y / o fiebre que no mejora con el mostrador  acetaminofeno o ibuprofeno.

## 2020-12-29 NOTE — ED PROVIDER NOTE - OBJECTIVE STATEMENT
pt c/o sore throat, cough, runny nose, myalgias for a few days. Denies HA/dizziness/chest pain/palpitation/sob/abd pain/n/v/d/ black stool/bloody stool/urinary sxs

## 2020-12-29 NOTE — ED PROVIDER NOTE - ATTENDING CONTRIBUTION TO CARE
I personally evaluated the patient. I reviewed the Resident’s or Physician Assistant’s note (as assigned above), and agree with the findings and plan except as documented in my note.   see progress note

## 2020-12-29 NOTE — ED PROVIDER NOTE - PATIENT PORTAL LINK FT
You can access the FollowMyHealth Patient Portal offered by [facility] by registering at the following website: [email or web address]. By joining [employer]’s FollowMyHealth portal, you will also be able to view your health information using other applications (apps) compatible with our system.

## 2020-12-29 NOTE — ED PROVIDER NOTE - NS ED ROS FT
Constitutional: no recent weight loss, change in appetite   Eyes: no redness/discharge/pain/vision changes  ENT: no ear pain  Cardiac: No chest pain, SOB or edema.  Respiratory: No respiratory distress  GI: No nausea, vomiting, diarrhea or abdominal pain.  : No dysuria, frequency, urgency or hematuria  MS: no pain to back or extremities, no loss of ROM, no weakness  Neuro: No headache or weakness. No LOC.  Skin: No skin rash.  Endocrine: No history of thyroid disease or diabetes.  Except as documented in the HPI, all other systems are negative.

## 2020-12-30 LAB — SARS-COV-2 RNA SPEC QL NAA+PROBE: DETECTED

## 2021-02-23 ENCOUNTER — EMERGENCY (EMERGENCY)
Facility: HOSPITAL | Age: 51
LOS: 0 days | Discharge: HOME | End: 2021-02-23
Attending: EMERGENCY MEDICINE | Admitting: EMERGENCY MEDICINE
Payer: MEDICAID

## 2021-02-23 VITALS
RESPIRATION RATE: 18 BRPM | SYSTOLIC BLOOD PRESSURE: 135 MMHG | TEMPERATURE: 98 F | OXYGEN SATURATION: 98 % | DIASTOLIC BLOOD PRESSURE: 65 MMHG | HEART RATE: 77 BPM

## 2021-02-23 DIAGNOSIS — H01.001 UNSPECIFIED BLEPHARITIS RIGHT UPPER EYELID: ICD-10-CM

## 2021-02-23 DIAGNOSIS — Z79.899 OTHER LONG TERM (CURRENT) DRUG THERAPY: ICD-10-CM

## 2021-02-23 DIAGNOSIS — H57.10 OCULAR PAIN, UNSPECIFIED EYE: ICD-10-CM

## 2021-02-23 PROCEDURE — 99283 EMERGENCY DEPT VISIT LOW MDM: CPT

## 2021-02-23 RX ORDER — OFLOXACIN 0.3 %
1 DROPS OPHTHALMIC (EYE) ONCE
Refills: 0 | Status: DISCONTINUED | OUTPATIENT
Start: 2021-02-23 | End: 2021-02-23

## 2021-02-23 NOTE — ED PROVIDER NOTE - NSFOLLOWUPCLINICS_GEN_ALL_ED_FT
[facility]  Ophthalmolgy  [address], [address]  [place] [address]  Phone: [phone]  Fax:   Follow Up Time:

## 2021-02-23 NOTE — ED PROVIDER NOTE - PATIENT PORTAL LINK FT
You can access the FollowMyHealth Patient Portal offered by [facility] by registering at the following website: [email or web address]. By joining [employer]’s FollowMyHealth portal, you will also be able to view your health information using other applications (apps) compatible with our system.

## 2021-02-23 NOTE — ED PROVIDER NOTE - OBJECTIVE STATEMENT
51 y/o M w no pmh presents w r upper eye lid slight swelling and redness. No trauma (contrary to triage note). No fever. No pain w EOM. No vision changes.

## 2021-02-23 NOTE — ED PROVIDER NOTE - NSFOLLOWUPINSTRUCTIONS_ED_ALL_ED_FT
Blepharitis    Blepharitis is inflammation of the eyelids. Blepharitis may happen with:    Reddish, scaly skin around the scalp and eyebrows.  Burning or itching of the eyelids.  Eye discharge at night that causes the eyelashes to stick together in the morning.  Eyelashes that fall out.  Sensitivity to light.     HOME CARE INSTRUCTIONS  Pay attention to any changes in how you look or feel. Follow these instructions to help with your condition:    Keeping Clean    Wash your hands often.  Wash your eyelids with warm water or with warm water that is mixed with a small amount of baby shampoo. Do this two times per day or as often as needed.  Wash your face and eyebrows at least once a day.  Use a clean towel each time you dry your eyelids. Do not use this towel to clean or dry other areas of your body. Do not share your towel with anyone.    General Instructions    Avoid wearing makeup until you get better. Do not share makeup with anyone.  Avoid rubbing your eyes.  Apply warm compresses to your eyes 2 times per day for 10 minutes at a time, or as told by your health care provider.  If you were prescribed an antibiotic ointment or steroid drops, apply or use the medicine as told by your health care provider. Do not stop using the medicine even if you feel better.  Keep all follow-up visits as told by your health care provider. This is important.    SEEK MEDICAL CARE IF:  Your eyelids feel hot.  You have blisters or a rash on your eyelids.  The condition does not go away in 2–4 days.  The inflammation gets worse.    SEEK IMMEDIATE MEDICAL CARE IF:  You have pain or redness that gets worse or spreads to other parts of your face.  Your vision changes.  You have pain when looking at lights or moving objects.  You have a fever.    ADDITIONAL NOTES AND INSTRUCTIONS    Please follow up with your Primary MD in 24-48 hr.  Seek immediate medical care for any new/worsening signs or symptoms.

## 2021-02-23 NOTE — ED ADULT NURSE NOTE - CHIEF COMPLAINT
Patient discharged home via private vehicle. Patient verbally understands discharge instructions. Patient's IV out, and has all belongings.    The patient is a 50y Male complaining of eye pain/injury.

## 2021-02-23 NOTE — ED PROVIDER NOTE - PROGRESS NOTE DETAILS
Will start the patient on augmentin, ofloxacin drops and recommend optho clinic f/u within 2-3 days. Pt agreed w the plan. Full DC instructions discussed and patient knows when to seek immediate medical attention. Patient has proper follow-up. All results discussed with the patient they may require further work-up. Limitations of ED work-up discussed. All  questions and concerns from patient or family addressed. Understanding of insturctions verbalized

## 2021-03-04 ENCOUNTER — OUTPATIENT (OUTPATIENT)
Dept: OUTPATIENT SERVICES | Facility: HOSPITAL | Age: 51
LOS: 1 days | Discharge: HOME | End: 2021-03-04
Payer: MEDICAID

## 2021-03-04 ENCOUNTER — APPOINTMENT (OUTPATIENT)
Dept: OPHTHALMOLOGY | Facility: CLINIC | Age: 51
End: 2021-03-04

## 2021-03-04 PROCEDURE — 92134 CPTRZ OPH DX IMG PST SGM RTA: CPT | Mod: 26

## 2021-03-04 PROCEDURE — 92020 GONIOSCOPY: CPT

## 2021-03-04 PROCEDURE — 92014 COMPRE OPH EXAM EST PT 1/>: CPT

## 2021-03-09 ENCOUNTER — APPOINTMENT (OUTPATIENT)
Dept: OPHTHALMOLOGY | Facility: CLINIC | Age: 51
End: 2021-03-09

## 2021-03-09 ENCOUNTER — OUTPATIENT (OUTPATIENT)
Dept: OUTPATIENT SERVICES | Facility: HOSPITAL | Age: 51
LOS: 1 days | Discharge: HOME | End: 2021-03-09
Payer: SUBSIDIZED

## 2021-03-09 DIAGNOSIS — H25.13 AGE-RELATED NUCLEAR CATARACT, BILATERAL: ICD-10-CM

## 2021-03-09 DIAGNOSIS — H11.053 PERIPHERAL PTERYGIUM, PROGRESSIVE, BILATERAL: ICD-10-CM

## 2021-03-09 DIAGNOSIS — H35.363 DRUSEN (DEGENERATIVE) OF MACULA, BILATERAL: ICD-10-CM

## 2021-03-09 PROCEDURE — 92012 INTRM OPH EXAM EST PATIENT: CPT

## 2021-03-10 DIAGNOSIS — H11.053 PERIPHERAL PTERYGIUM, PROGRESSIVE, BILATERAL: ICD-10-CM

## 2021-03-10 DIAGNOSIS — H54.61 UNQUALIFIED VISUAL LOSS, RIGHT EYE, NORMAL VISION LEFT EYE: ICD-10-CM

## 2021-03-10 DIAGNOSIS — H35.363 DRUSEN (DEGENERATIVE) OF MACULA, BILATERAL: ICD-10-CM

## 2021-03-31 ENCOUNTER — OUTPATIENT (OUTPATIENT)
Dept: OUTPATIENT SERVICES | Facility: HOSPITAL | Age: 51
LOS: 1 days | Discharge: HOME | End: 2021-03-31
Payer: SUBSIDIZED

## 2021-03-31 ENCOUNTER — OUTPATIENT (OUTPATIENT)
Dept: OUTPATIENT SERVICES | Facility: HOSPITAL | Age: 51
LOS: 1 days | Discharge: HOME | End: 2021-03-31

## 2021-03-31 DIAGNOSIS — S63.501A UNSPECIFIED SPRAIN OF RIGHT WRIST, INITIAL ENCOUNTER: ICD-10-CM

## 2021-03-31 DIAGNOSIS — M54.5 LOW BACK PAIN: ICD-10-CM

## 2021-03-31 DIAGNOSIS — M77.10 LATERAL EPICONDYLITIS, UNSPECIFIED ELBOW: ICD-10-CM

## 2021-03-31 PROCEDURE — 73110 X-RAY EXAM OF WRIST: CPT | Mod: 26,RT

## 2021-03-31 PROCEDURE — 72110 X-RAY EXAM L-2 SPINE 4/>VWS: CPT | Mod: 26

## 2021-03-31 PROCEDURE — 73080 X-RAY EXAM OF ELBOW: CPT | Mod: 26,RT

## 2021-04-06 DIAGNOSIS — M54.5 LOW BACK PAIN: ICD-10-CM

## 2021-04-06 DIAGNOSIS — S63.501A UNSPECIFIED SPRAIN OF RIGHT WRIST, INITIAL ENCOUNTER: ICD-10-CM

## 2021-04-06 DIAGNOSIS — M77.10 LATERAL EPICONDYLITIS, UNSPECIFIED ELBOW: ICD-10-CM

## 2021-06-13 NOTE — ED ADULT NURSE NOTE - FINAL NURSING ELECTRONIC SIGNATURE
pt bib ems from [facility] fellowship for eval s/p fall from chair. pt unable to provide reliable hx due to dementia, but denies pain.  chronically ill female, nad, perrl, eomi, mmm, s1s2 rrr, lungs cta, ribs nt, abd soft, nt, no cvat, ext nt, full rom, neuro awake alert, confused, no focal motor or sensory deficits skin no abrasion or laceration 05-Mar-2020 22:12

## 2021-08-12 ENCOUNTER — OUTPATIENT (OUTPATIENT)
Dept: OUTPATIENT SERVICES | Facility: HOSPITAL | Age: 51
LOS: 1 days | Discharge: HOME | End: 2021-08-12

## 2021-08-12 DIAGNOSIS — M54.5 LOW BACK PAIN: ICD-10-CM

## 2021-08-12 DIAGNOSIS — M77.10 LATERAL EPICONDYLITIS, UNSPECIFIED ELBOW: ICD-10-CM

## 2021-09-14 ENCOUNTER — APPOINTMENT (OUTPATIENT)
Dept: OPHTHALMOLOGY | Facility: CLINIC | Age: 51
End: 2021-09-14

## 2021-09-14 ENCOUNTER — OUTPATIENT (OUTPATIENT)
Dept: OUTPATIENT SERVICES | Facility: HOSPITAL | Age: 51
LOS: 1 days | Discharge: HOME | End: 2021-09-14
Payer: SUBSIDIZED

## 2021-09-14 PROCEDURE — 92202 OPSCPY EXTND ON/MAC DRAW: CPT

## 2021-09-14 PROCEDURE — 92014 COMPRE OPH EXAM EST PT 1/>: CPT

## 2021-09-15 DIAGNOSIS — H11.053 PERIPHERAL PTERYGIUM, PROGRESSIVE, BILATERAL: ICD-10-CM

## 2021-09-15 DIAGNOSIS — H35.363 DRUSEN (DEGENERATIVE) OF MACULA, BILATERAL: ICD-10-CM

## 2021-09-15 DIAGNOSIS — H25.13 AGE-RELATED NUCLEAR CATARACT, BILATERAL: ICD-10-CM

## 2021-10-02 ENCOUNTER — EMERGENCY (EMERGENCY)
Facility: HOSPITAL | Age: 51
LOS: 0 days | Discharge: HOME | End: 2021-10-02
Attending: STUDENT IN AN ORGANIZED HEALTH CARE EDUCATION/TRAINING PROGRAM | Admitting: STUDENT IN AN ORGANIZED HEALTH CARE EDUCATION/TRAINING PROGRAM
Payer: MEDICAID

## 2021-10-02 VITALS
SYSTOLIC BLOOD PRESSURE: 126 MMHG | WEIGHT: 177.69 LBS | OXYGEN SATURATION: 95 % | TEMPERATURE: 97 F | RESPIRATION RATE: 17 BRPM | HEART RATE: 71 BPM | DIASTOLIC BLOOD PRESSURE: 65 MMHG
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
## 2021-10-02 DIAGNOSIS — S60.562A INSECT BITE (NONVENOMOUS) OF LEFT HAND, INITIAL ENCOUNTER: ICD-10-CM

## 2021-10-02 PROCEDURE — 99283 EMERGENCY DEPT VISIT LOW MDM: CPT

## 2021-10-02 RX ORDER — DIPHENHYDRAMINE HCL 50 MG
50 CAPSULE ORAL ONCE
Refills: 0 | Status: COMPLETED | OUTPATIENT
Start: 2021-10-02 | End: 2021-10-02

## 2021-10-02 RX ORDER — DEXAMETHASONE 0.5 MG/5ML
10 ELIXIR ORAL ONCE
Refills: 0 | Status: COMPLETED | OUTPATIENT
Start: 2021-10-02 | End: 2021-10-02

## 2021-10-02 RX ADMIN — Medication 10 MILLIGRAM(S): at 21:18

## 2021-10-02 RX ADMIN — Medication 50 MILLIGRAM(S): at 21:18

## 2021-10-02 NOTE — ED PROVIDER NOTE - ATTENDING CONTRIBUTION TO CARE
52 yo m denies pmh presents for eval of insect bite to L hand x1 day. pt states he was stung by a bee and had swelling and itching to area. no throat swelling/sob.  no joint pain/fever.    vss  gen- NAD, aaox3  card-rrr  lungs-ctab, no wheezing or rhonchi  neuro- full str/sensation, cn ii-xii grossly intact, normal coordination and gait  L hand- no focal swelling, no hives, no joint swelling, FROM to joints, radial pulse 2+ (exam after meds given)    eval c/w resolving local reaction to bee sting  unlikely cellulitis, no e/o FB  supportive care w/ steroid and antihistamine  outpt f/u

## 2021-10-02 NOTE — ED PROVIDER NOTE - OBJECTIVE STATEMENT
51 year old male with pmhx noted presents with insect bite to left hand x 1 day ago. pt admits was stugn by a bee, and noticed some mild swelling and itching to area today. no pain or fever.

## 2021-10-02 NOTE — ED ADULT NURSE NOTE - OBJECTIVE STATEMENT
pt states he was bite by "yellow bug" yesterday on left hand. endorses swelling starting today. no throat swelling

## 2021-10-02 NOTE — ED PROVIDER NOTE - NS ED ROS FT
Review of Systems:  	•	CONSTITUTIONAL - no fever, no diaphoresis, no chills  	•	SKIN - no rash  	•	HEMATOLOGIC - no bleeding, no bruising  	•	MUSCULOSKELETAL - no joint paint, + swelling, no redness  	•	NEUROLOGIC - no weakness, no paresthesias

## 2021-10-02 NOTE — ED PROVIDER NOTE - PATIENT PORTAL LINK FT
You can access the FollowMyHealth Patient Portal offered by [facility] by registering at the following website: [email or web address]. By joining [employer]’s FollowMyHealth portal, you will also be able to view your health information using other applications (apps) compatible with our system.

## 2021-10-02 NOTE — ED ADULT TRIAGE NOTE - CHIEF COMPLAINT QUOTE
Pt was bit on his left hand by a bee yesterday, today complaining of swelling to the hand. No throat swelling or difficulty swallowing or diff breathing.

## 2021-10-02 NOTE — ED ADULT NURSE NOTE - NSIMPLEMENTINTERV_GEN_ALL_ED
Implemented All Universal Safety Interventions:  [place] to call system. Call bell, personal items and telephone within reach. Instruct patient to call for assistance. Room bathroom lighting operational. Non-slip footwear when patient is off stretcher. Physically safe environment: no spills, clutter or unnecessary equipment. Stretcher in lowest position, wheels locked, appropriate side rails in place.

## 2021-11-23 ENCOUNTER — OUTPATIENT (OUTPATIENT)
Dept: OUTPATIENT SERVICES | Facility: HOSPITAL | Age: 51
LOS: 1 days | Discharge: HOME | End: 2021-11-23

## 2022-01-04 ENCOUNTER — OUTPATIENT (OUTPATIENT)
Dept: OUTPATIENT SERVICES | Facility: HOSPITAL | Age: 52
LOS: 1 days | Discharge: HOME | End: 2022-01-04

## 2022-02-02 ENCOUNTER — OUTPATIENT (OUTPATIENT)
Dept: OUTPATIENT SERVICES | Facility: HOSPITAL | Age: 52
LOS: 1 days | Discharge: HOME | End: 2022-02-02

## 2022-02-02 ENCOUNTER — APPOINTMENT (OUTPATIENT)
Dept: INTERNAL MEDICINE | Facility: CLINIC | Age: 52
End: 2022-02-02
Payer: COMMERCIAL

## 2022-02-02 ENCOUNTER — NON-APPOINTMENT (OUTPATIENT)
Age: 52
End: 2022-02-02

## 2022-02-02 VITALS
OXYGEN SATURATION: 97 % | SYSTOLIC BLOOD PRESSURE: 131 MMHG | BODY MASS INDEX: 28.56 KG/M2 | HEIGHT: 67 IN | WEIGHT: 182 LBS | HEART RATE: 73 BPM | DIASTOLIC BLOOD PRESSURE: 81 MMHG | TEMPERATURE: 98.1 F

## 2022-02-02 DIAGNOSIS — R63.5 ABNORMAL WEIGHT GAIN: ICD-10-CM

## 2022-02-02 PROCEDURE — 99213 OFFICE O/P EST LOW 20 MIN: CPT | Mod: GC

## 2022-02-02 RX ORDER — NAPROXEN 500 MG/1
500 TABLET ORAL
Refills: 0 | Status: DISCONTINUED | COMMUNITY
End: 2022-02-02

## 2022-02-02 RX ORDER — NAPROXEN 500 MG/1
500 TABLET ORAL
Qty: 60 | Refills: 1 | Status: DISCONTINUED | COMMUNITY
Start: 2018-04-23 | End: 2022-02-02

## 2022-02-02 NOTE — HISTORY OF PRESENT ILLNESS
[FreeTextEntry1] : weight gain of 20 kgs  [de-identified] : 51 y.o man with no previous medical hx is here for recent weight gain of 20 kgs over the last year\par he said that Dr jimenez told him that he has thyroid problem but no TSH was performed at that time\par he denies any cold intolerance, no fatigue, no dry skin, no hair loss, no constipation, no bradycardia and no depression \par he denies any polyuria, no polydipsia

## 2022-02-02 NOTE — PHYSICAL EXAM
[No Acute Distress] : no acute distress [Well Nourished] : well nourished [Well Developed] : well developed [Well-Appearing] : well-appearing [Thyroid Normal, No Nodules] : the thyroid was normal and there were no nodules present [No Respiratory Distress] : no respiratory distress  [No Accessory Muscle Use] : no accessory muscle use [Normal Rate] : normal rate  [Regular Rhythm] : with a regular rhythm [Normal] : normal rate, regular rhythm, normal S1 and S2 and no murmur heard [No Carotid Bruits] : no carotid bruits [No Edema] : there was no peripheral edema [Soft] : abdomen soft [Non Tender] : non-tender [Non-distended] : non-distended

## 2022-02-02 NOTE — REVIEW OF SYSTEMS
[Negative] : Respiratory [Fever] : no fever [Fatigue] : no fatigue [Night Sweats] : no night sweats [Chest Pain] : no chest pain [Palpitations] : no palpitations [Orthopena] : no orthopnea [Shortness Of Breath] : no shortness of breath [Wheezing] : no wheezing [Abdominal Pain] : no abdominal pain [Nausea] : no nausea [Vomiting] : no vomiting [Heartburn] : no heartburn

## 2022-02-02 NOTE — ASSESSMENT
[FreeTextEntry1] : 51 y.o man with no previous medical hx is here for recent weight gain of 20 kgs over the last year\par he said that Dr jimenez told him that he has thyroid problem but no TSH was performed at that time\par he denies any cold intolerance, no fatigue, no dry skin, no hair loss, no constipation, no bradycardia and no depression \par he denies any polyuria, no polydipsia\par \par # weight gain:\par - no signs of hypothyroidism on exam \par - check hba1c, tsh, cbc, lipid profile and cmp\par - non palpable thyroid , no signs of [place] sd, no mood disorders \par - refer to a dietician \par - denies any smoking cessation ( never smoker ) \par - his diet has been the same since few years\par \par # HCM:\par - patient wants to think about colonoscopy\par - f/u in 3 months and PRN after blood test results

## 2022-02-03 DIAGNOSIS — R63.5 ABNORMAL WEIGHT GAIN: ICD-10-CM

## 2022-02-03 LAB
ALBUMIN SERPL ELPH-MCNC: 4.7 G/DL
ALP BLD-CCNC: 95 U/L
ALT SERPL-CCNC: 38 U/L
ANION GAP SERPL CALC-SCNC: 14 MMOL/L
AST SERPL-CCNC: 27 U/L
BASOPHILS # BLD AUTO: 0.02 K/UL
BASOPHILS NFR BLD AUTO: 0.3 %
BILIRUB SERPL-MCNC: 0.6 MG/DL
BUN SERPL-MCNC: 15 MG/DL
CALCIUM SERPL-MCNC: 9.6 MG/DL
CHLORIDE SERPL-SCNC: 99 MMOL/L
CHOLEST SERPL-MCNC: 272 MG/DL
CO2 SERPL-SCNC: 24 MMOL/L
CREAT SERPL-MCNC: 0.8 MG/DL
EOSINOPHIL # BLD AUTO: 0.45 K/UL
EOSINOPHIL NFR BLD AUTO: 7.1 %
ESTIMATED AVERAGE GLUCOSE: 111 MG/DL
GLUCOSE SERPL-MCNC: 86 MG/DL
HBA1C MFR BLD HPLC: 5.5 %
HCT VFR BLD CALC: 44.8 %
HDLC SERPL-MCNC: 52 MG/DL
HGB BLD-MCNC: 14.9 G/DL
IMM GRANULOCYTES NFR BLD AUTO: 0.3 %
LDLC SERPL CALC-MCNC: 191 MG/DL
LYMPHOCYTES # BLD AUTO: 2.01 K/UL
LYMPHOCYTES NFR BLD AUTO: 31.7 %
MAN DIFF?: NORMAL
MCHC RBC-ENTMCNC: 29.7 PG
MCHC RBC-ENTMCNC: 33.3 G/DL
MCV RBC AUTO: 89.2 FL
MONOCYTES # BLD AUTO: 0.41 K/UL
MONOCYTES NFR BLD AUTO: 6.5 %
NEUTROPHILS # BLD AUTO: 3.44 K/UL
NEUTROPHILS NFR BLD AUTO: 54.1 %
NONHDLC SERPL-MCNC: 220 MG/DL
PLATELET # BLD AUTO: 215 K/UL
POTASSIUM SERPL-SCNC: 4.2 MMOL/L
PROT SERPL-MCNC: 7.4 G/DL
RBC # BLD: 5.02 M/UL
RBC # FLD: 12.2 %
SODIUM SERPL-SCNC: 137 MMOL/L
TRIGL SERPL-MCNC: 228 MG/DL
TSH SERPL-ACNC: 8.02 UIU/ML
WBC # FLD AUTO: 6.35 K/UL

## 2022-02-07 ENCOUNTER — NON-APPOINTMENT (OUTPATIENT)
Age: 52
End: 2022-02-07

## 2022-02-08 ENCOUNTER — NON-APPOINTMENT (OUTPATIENT)
Age: 52
End: 2022-02-08

## 2022-02-08 ENCOUNTER — OUTPATIENT (OUTPATIENT)
Dept: OUTPATIENT SERVICES | Facility: HOSPITAL | Age: 52
LOS: 1 days | Discharge: HOME | End: 2022-02-08

## 2022-02-08 ENCOUNTER — APPOINTMENT (OUTPATIENT)
Dept: NUTRITION | Facility: CLINIC | Age: 52
End: 2022-02-08

## 2022-02-15 DIAGNOSIS — Z71.3 DIETARY COUNSELING AND SURVEILLANCE: ICD-10-CM

## 2022-02-18 ENCOUNTER — EMERGENCY (EMERGENCY)
Facility: HOSPITAL | Age: 52
LOS: 0 days | Discharge: HOME | End: 2022-02-18
Attending: EMERGENCY MEDICINE | Admitting: EMERGENCY MEDICINE
Payer: MEDICAID

## 2022-02-18 VITALS
SYSTOLIC BLOOD PRESSURE: 136 MMHG | TEMPERATURE: 98 F | HEART RATE: 73 BPM | HEIGHT: 62.99 IN | WEIGHT: 176.37 LBS | RESPIRATION RATE: 18 BRPM | DIASTOLIC BLOOD PRESSURE: 72 MMHG | OXYGEN SATURATION: 97 %

## 2022-02-18 DIAGNOSIS — H92.02 OTALGIA, LEFT EAR: ICD-10-CM

## 2022-02-18 DIAGNOSIS — B34.9 VIRAL INFECTION, UNSPECIFIED: ICD-10-CM

## 2022-02-18 DIAGNOSIS — R07.0 PAIN IN THROAT: ICD-10-CM

## 2022-02-18 DIAGNOSIS — Z20.822 CONTACT WITH AND (SUSPECTED) EXPOSURE TO COVID-19: ICD-10-CM

## 2022-02-18 PROCEDURE — 99284 EMERGENCY DEPT VISIT MOD MDM: CPT

## 2022-02-18 RX ORDER — DEXAMETHASONE 0.5 MG/5ML
10 ELIXIR ORAL ONCE
Refills: 0 | Status: COMPLETED | OUTPATIENT
Start: 2022-02-18 | End: 2022-02-18

## 2022-02-18 RX ADMIN — Medication 10 MILLIGRAM(S): at 21:38

## 2022-02-18 NOTE — ED PROVIDER NOTE - PROGRESS NOTE DETAILS
steroid and abx given. Return precautions verbalized and understood. Pt will receive text with his covid rsults.

## 2022-02-18 NOTE — ED PROVIDER NOTE - PHYSICAL EXAMINATION
Physical Exam    Vital Signs: I have reviewed the initial vital signs.  Constitutional: well-nourished, appears stated age, no acute distress  Eyes: Conjunctiva pink, Sclera clear, PERRLA, EOMI, no ptosis, no entrapment, no racoon eyes.  Ears: No hemotypanum, TM intact, with cone of light visualized. Erythema of TM on L side, non bulging, no mastoid tenderness redness or swelling noted   mouth: + oropharyngeal injection, + edematous tonislls without exudates areas of fluctuance or induration   Cardiovascular: S1 and S2, regular rate, regular rhythm, well-perfused extremities, radial pulses equal and 2+, calves nonttp, equal in size  Respiratory: unlabored respiratory effort, speaking in full sentences, handling oral secretions ,clear to auscultation bilaterally no wheezing, rales and rhonchi  Musculoskeletal: supple neck no nucchal rigidity   Integumentary: warm, dry, no rashes, lacerations,  Neurologic: awake, alert

## 2022-02-18 NOTE — ED PROVIDER NOTE - ATTENDING CONTRIBUTION TO CARE
Patient is c/o sore throat and LT ear pain. Denies trauma. +pain on swallowing.   Vitals reviewed.   Patient is awake, alert, answering questions appropriately, appears comfortable and not in any distress.  Throat: B/L enlarged tonsils, +tonsilopharyngeal erythema, no exudates, +erythema and mild edema of the uvula, no pooling of the secretions noted.   supple neck, no lymphadenopathy.   Lungs: CTA, no wheezing, no crackles.  Abd: +BS, NT, ND, soft, no rebound, no guarding. No CVA tenderness, no rash.  CNS: awake, alert, o x 3, no focal neurologic deficits.  A/P: Tonsillitis,   abx, close outpatient follow up.

## 2022-02-18 NOTE — ED PROVIDER NOTE - NSFOLLOWUPINSTRUCTIONS_ED_ALL_ED_FT
TAKE YOUR MEDICATIONS AS DIRECTED.     Viral Illness, Adult  Viruses are tiny germs that can get into a person's body and cause illness. There are many different types of viruses, and they cause many types of illness. Viral illnesses can range from mild to severe. They can affect various parts of the body.    Common illnesses that are caused by a virus include colds and the flu. Viral illnesses also include serious conditions such as HIV/AIDS (human immunodeficiency virus/acquired immunodeficiency syndrome). A few viruses have been linked to certain cancers.    What are the causes?  Many types of viruses can cause illness. Viruses invade cells in your body, multiply, and cause the infected cells to malfunction or die. When the cell dies, it releases more of the virus. When this happens, you develop symptoms of the illness, and the virus continues to spread to other cells. If the virus takes over the function of the cell, it can cause the cell to divide and grow out of control, as is the case when a virus causes cancer.    Different viruses get into the body in different ways. You can get a virus by:    Swallowing food or water that is contaminated with the virus.  Breathing in droplets that have been coughed or sneezed into the air by an infected person.  Touching a surface that has been contaminated with the virus and then touching your eyes, nose, or mouth.  Being bitten by an insect or animal that carries the virus.  Having sexual contact with a person who is infected with the virus.  Being exposed to blood or fluids that contain the virus, either through an open cut or during a transfusion.    If a virus enters your body, your body's defense system (immune system) will try to fight the virus. You may be at higher risk for a viral illness if your immune system is weak.    What are the signs or symptoms?  Symptoms vary depending on the type of virus and the location of the cells that it invades. Common symptoms of the main types of viral illnesses include:    Cold and flu viruses     Fever.  Headache.  Sore throat.  Muscle aches.  Nasal congestion.  Cough.  Digestive system (gastrointestinal) viruses     Fever.  Abdominal pain.  Nausea.  Diarrhea.  Liver viruses (hepatitis)     Loss of appetite.  Tiredness.  Yellowing of the skin (jaundice).  Brain and spinal cord viruses     Fever.  Headache.  Stiff neck.  Nausea and vomiting.  Confusion or sleepiness.  Skin viruses     Warts.  Itching.  Rash.  Sexually transmitted viruses     Discharge.  Swelling.  Redness.  Rash.  How is this treated?  Viruses can be difficult to treat because they live within cells. Antibiotic medicines do not treat viruses because these drugs do not get inside cells. Treatment for a viral illness may include:    Resting and drinking plenty of fluids.  Medicines to relieve symptoms. These can include over-the-counter medicine for pain and fever, medicines for cough or congestion, and medicines to relieve diarrhea.  Antiviral medicines. These drugs are available only for certain types of viruses. They may help reduce flu symptoms if taken early. There are also many antiviral medicines for hepatitis and HIV/AIDS.    Some viral illnesses can be prevented with vaccinations. A common example is the flu shot.    Follow these instructions at home:  Medicines     Image   Take over-the-counter and prescription medicines only as told by your health care provider.  If you were prescribed an antiviral medicine, take it as told by your health care provider. Do not stop taking the medicine even if you start to feel better.  Be aware of when antibiotics are needed and when they are not needed. Antibiotics do not treat viruses. If your health care provider thinks that you may have a bacterial infection as well as a viral infection, you may get an antibiotic.    Do not ask for an antibiotic prescription if you have been diagnosed with a viral illness. That will not make your illness go away faster.  Frequently taking antibiotics when they are not needed can lead to antibiotic resistance. When this develops, the medicine no longer works against the bacteria that it normally fights.    General instructions     Drink enough fluids to keep your urine clear or pale yellow.  Rest as much as possible.  Return to your normal activities as told by your health care provider. Ask your health care provider what activities are safe for you.  Keep all follow-up visits as told by your health care provider. This is important.  How is this prevented?  ImageTake these actions to reduce your risk of viral infection:    Eat a healthy diet and get enough rest.  Wash your hands often with soap and water. This is especially important when you are in public places. If soap and water are not available, use hand .  Avoid close contact with friends and family who have a viral illness.  If you travel to areas where viral gastrointestinal infection is common, avoid drinking water or eating raw food.  Keep your immunizations up to date. Get a flu shot every year as told by your health care provider.  Do not share toothbrushes, nail clippers, razors, or needles with other people.  Always practice safe sex.    Contact a health care provider if:  You have symptoms of a viral illness that do not go away.  Your symptoms come back after going away.  Your symptoms get worse.  Get help right away if:  You have trouble breathing.  You have a severe headache or a stiff neck.  You have severe vomiting or abdominal pain.  This information is not intended to replace advice given to you by your health care provider. Make sure you discuss any questions you have with your health care provider.

## 2022-02-18 NOTE — ED PROVIDER NOTE - PATIENT PORTAL LINK FT
You can access the FollowMyHealth Patient Portal offered by [facility] by registering at the following website: [email or web address]. By joining [employer]’s FollowMyHealth portal, you will also be able to view your health information using other applications (apps) compatible with our system.

## 2022-02-18 NOTE — ED PROVIDER NOTE - OBJECTIVE STATEMENT
51 y.o. male no pmh here for L ear pain and throat pain x 2 days. + difficulty swallowing. No trouble breathing, handling oral secretions or change in voice. No fever chills, N/V, cp cough or shb. No rhinorrhea. No sick contacts. Is vaccinated for covid x 2 but has not been tested.

## 2022-02-19 LAB — SARS-COV-2 RNA SPEC QL NAA+PROBE: SIGNIFICANT CHANGE UP

## 2022-02-22 ENCOUNTER — EMERGENCY (EMERGENCY)
Facility: HOSPITAL | Age: 52
LOS: 0 days | Discharge: HOME | End: 2022-02-22
Attending: EMERGENCY MEDICINE | Admitting: EMERGENCY MEDICINE
Payer: MEDICAID

## 2022-02-22 VITALS
OXYGEN SATURATION: 100 % | DIASTOLIC BLOOD PRESSURE: 62 MMHG | HEIGHT: 62.99 IN | RESPIRATION RATE: 18 BRPM | WEIGHT: 176.37 LBS | SYSTOLIC BLOOD PRESSURE: 123 MMHG | HEART RATE: 80 BPM | TEMPERATURE: 98 F

## 2022-02-22 DIAGNOSIS — R05.9 COUGH, UNSPECIFIED: ICD-10-CM

## 2022-02-22 DIAGNOSIS — L53.9 ERYTHEMATOUS CONDITION, UNSPECIFIED: ICD-10-CM

## 2022-02-22 DIAGNOSIS — E78.5 HYPERLIPIDEMIA, UNSPECIFIED: ICD-10-CM

## 2022-02-22 DIAGNOSIS — R09.81 NASAL CONGESTION: ICD-10-CM

## 2022-02-22 DIAGNOSIS — J02.9 ACUTE PHARYNGITIS, UNSPECIFIED: ICD-10-CM

## 2022-02-22 DIAGNOSIS — R07.0 PAIN IN THROAT: ICD-10-CM

## 2022-02-22 PROCEDURE — 99284 EMERGENCY DEPT VISIT MOD MDM: CPT

## 2022-02-22 RX ORDER — DIPHENHYDRAMINE HYDROCHLORIDE AND LIDOCAINE HYDROCHLORIDE AND ALUMINUM HYDROXIDE AND MAGNESIUM HYDRO
30 KIT ONCE
Refills: 0 | Status: COMPLETED | OUTPATIENT
Start: 2022-02-22 | End: 2022-02-22

## 2022-02-22 RX ORDER — DEXTROMETHORPHAN HYDROBROMIDE, GUAIFENESIN, PHENYLEPHRINE HYDROCHLORIDE 17.5; 400; 1 MG/1; MG/1; MG/1
1 TABLET ORAL
Qty: 20 | Refills: 0
Start: 2022-02-22 | End: 2022-02-26

## 2022-02-22 RX ORDER — DEXAMETHASONE 0.5 MG/5ML
10 ELIXIR ORAL ONCE
Refills: 0 | Status: COMPLETED | OUTPATIENT
Start: 2022-02-22 | End: 2022-02-22

## 2022-02-22 RX ORDER — IBUPROFEN 200 MG
1 TABLET ORAL
Qty: 12 | Refills: 0
Start: 2022-02-22 | End: 2022-02-24

## 2022-02-22 RX ORDER — KETOROLAC TROMETHAMINE 30 MG/ML
30 SYRINGE (ML) INJECTION ONCE
Refills: 0 | Status: DISCONTINUED | OUTPATIENT
Start: 2022-02-22 | End: 2022-02-22

## 2022-02-22 RX ADMIN — Medication 30 MILLIGRAM(S): at 20:01

## 2022-02-22 RX ADMIN — Medication 10 MILLIGRAM(S): at 20:01

## 2022-02-22 RX ADMIN — DIPHENHYDRAMINE HYDROCHLORIDE AND LIDOCAINE HYDROCHLORIDE AND ALUMINUM HYDROXIDE AND MAGNESIUM HYDRO 30 MILLILITER(S): KIT at 20:01

## 2022-02-22 NOTE — ED PROVIDER NOTE - ATTENDING CONTRIBUTION TO CARE
50 yo male with PMH of HLD c/o of sore throat/cough/head congestion x 1 week. Was here on the 18th for same, and had covid swab which was negative. Pt states he was placed on ABX and has been taking that but not taking any ibuprofen or OTC cold/cough medication. Pt states he still has the cough, and every time he swallows/eats still feels sore throat. Denies SOB/CP/N/V/D/abdomen pain/leg pain or swelling/ complaints. On exam ncat, perrl, eomi, throat clear, no exudates, no edema, minimal erythema, Lungs CTAB Heart regular S1S2, Abdomen soft nt/nd +BS, no mass, Ext no edema or calf tenderness. Pt states last time he was here, the medicine he got helped him. Will give him decadron/toradol, and magic mouthwash. Recommend he should use ibuprofen and cough medication (requesting prescriptions for these which were sent to his pharmacy). No other acute intervention needed.     ALL: nkda  PMH high chol  Meds Amox/Clav

## 2022-02-22 NOTE — ED PROVIDER NOTE - PATIENT PORTAL LINK FT
You can access the FollowMyHealth Patient Portal offered by [facility] by registering at the following website: [email or web address]. By joining [employer]’s FollowMyHealth portal, you will also be able to view your health information using other applications (apps) compatible with our system.

## 2022-02-22 NOTE — ED ADULT TRIAGE NOTE - CHIEF COMPLAINT QUOTE
PT c/o of throat pain for 1 week. Pt states he is able to swallow and drink but just causes him pain. Pt denies fever. Pt went for recent COVID swab which tested (-).

## 2022-02-22 NOTE — ED PROVIDER NOTE - CLINICAL SUMMARY MEDICAL DECISION MAKING FREE TEXT BOX
50 yo male with PMH of HLD c/o of sore throat/cough/head congestion x 1 week. Was here on the 18th for same, and had covid swab which was negative. Pt states he was placed on ABX and has been taking that but not taking any ibuprofen or OTC cold/cough medication. Pt states he still has the cough, and every time he swallows/eats still feels sore throat. Denies SOB/CP/N/V/D/abdomen pain/leg pain or swelling/ complaints. On exam ncat, perrl, eomi, throat clear, no exudates, no edema, minimal erythema, Lungs CTAB Heart regular S1S2, Abdomen soft nt/nd +BS, no mass, Ext no edema or calf tenderness. Pt states last time he was here, the medicine he got helped him. Will give him decadron/toradol, and magic mouthwash. Recommend he should use ibuprofen and cough medication (requesting prescriptions for these which were sent to his pharmacy). No other acute intervention needed.

## 2022-02-22 NOTE — ED PROVIDER NOTE - NSFOLLOWUPINSTRUCTIONS_ED_ALL_ED_FT
Please follow up with your primary care physician within 24-72 hours and return immediately if symptoms worsen.    Sore Throat    A sore throat is pain, burning, irritation, or scratchiness in the throat. When you have a sore throat, you may feel pain or tenderness in your throat when you swallow or talk.    Many things can cause a sore throat, including:    An infection.  Seasonal allergies.  Dryness in the air.  Irritants, such as smoke or pollution.  Gastroesophageal reflux disease (GERD).  A tumor.    A sore throat is often the first sign of another sickness. It may happen with other symptoms, such as coughing, sneezing, fever, and swollen neck glands. Most sore throats go away without medical treatment.     HOME CARE INSTRUCTIONS  Take over-the-counter medicines only as told by your health care provider.  Drink enough fluids to keep your urine clear or pale yellow.  Rest as needed.  To help with pain, try:  Sipping warm liquids, such as broth, herbal tea, or warm water.  Eating or drinking cold or frozen liquids, such as frozen ice pops.  Gargling with a salt-water mixture 3–4 times a day or as needed. To make a salt-water mixture, completely dissolve ½–1 tsp of salt in 1 cup of warm water.  Sucking on hard candy or throat lozenges.  Putting a cool-mist humidifier in your bedroom at night to moisten the air.  Sitting in the bathroom with the door closed for 5–10 minutes while you run hot water in the shower.  Do not use any tobacco products, such as cigarettes, chewing tobacco, and e-cigarettes. If you need help quitting, ask your health care provider.    SEEK MEDICAL CARE IF:  You have a fever for more than 2–3 days.  You have symptoms that last (are persistent) for more than 2–3 days.  Your throat does not get better within 7 days.  You have a fever and your symptoms suddenly get worse.    SEEK IMMEDIATE MEDICAL CARE IF:  You have difficulty breathing.  You cannot swallow fluids, soft foods, or your saliva.  You have increased swelling in your throat or neck.  You have persistent nausea and vomiting.    ADDITIONAL NOTES AND INSTRUCTIONS    Please follow up with your Primary MD in 24-48 hr.  Seek immediate medical care for any new/worsening signs or symptoms.

## 2022-02-22 NOTE — ED PROVIDER NOTE - PHYSICAL EXAMINATION
Gen: NAD, AOx3  Head: NCAT  HEENT: PERRL, oral mucosa moist, normal conjunctiva, oropharynx without exudate and noted mild erythema, L TM minimally erythema   Lung: CTAB, no respiratory distress, no wheezing, rales, rhonchi  CV: normal s1/s2, rrr, Normal perfusion, pulses 2+ throughout  Abd: soft, NTND, no CVA tenderness  Genitourinary: no pelvic tenderness  MSK: No edema, no visible deformities, full range of motion in all 4 extremities  Neuro: CN II-XII grossly intact, No focal neurologic deficits  Skin: No rash   Psych: normal affect

## 2022-02-22 NOTE — ED PROVIDER NOTE - CCCP TRG CHIEF CMPLNT
[facility]  Cardiology  Progress Note    Patient Name: Shai Yeager  MRN: [id number]  Admission Date: 4/17/2019  Hospital Length of Stay: 0 days  Code Status: Full Code   Attending Physician: Kenyon Santana MD   Primary Care Physician: Carl Wright MD  Expected Discharge Date:   Principal Problem:Acute on chronic diastolic heart failure    Subjective:     Hospital Course:   04/17/2019 Patient admitted to [facility]. Trend CE and EKG. Trial diuresis with Lasix 40 mg IV x 1. Initial trop and EKG unremarkable. Low suspicion for ACS at this time.   04/18/2019 Hemodynamically stable. Trop negative. Diuresing with IV Lasix, no chest pain, improvement in SOB. NPO for stress test.    Past Medical History:   Diagnosis Date    Anxiety     Arthritis     Coronary artery disease     Dementia     Diabetes mellitus type I     Hyperlipidemia     Skin abrasion     Thyroid disease        Past Surgical History:   Procedure Laterality Date    CORONARY STENT PLACEMENT      CORONARY STENT PLACEMENT  10/04/2016    four stent     EYE SURGERY  2010    cataract    EYE SURGERY  20000    cataract       Review of patient's allergies indicates:  No Known Allergies    No current facility-administered medications on file prior to encounter.      Current Outpatient Medications on File Prior to Encounter   Medication Sig    amLODIPine (NORVASC) 10 MG tablet TAKE 1 TABLET BY MOUTH EVERY DAY    aspirin 81 MG Chew Take 81 mg by mouth once daily.    atorvastatin (LIPITOR) 80 MG tablet TAKE 1 TABLET BY MOUTH ONCE A DAY    carvedilol (COREG) 25 MG tablet TAKE 1 TABLET BY MOUTH TWO TIMES A DAY WITH MEALS    clonazePAM (KLONOPIN) 1 MG tablet TAKE 1 TABLET BY MOUTH TWO TIMES A DAY AS NEEDED FOR ANXIETY    cloNIDine (CATAPRES) 0.1 MG tablet Take 1 tablet (0.1 mg total) by mouth 2 (two) times daily.    clopidogrel (PLAVIX) 75 mg tablet TAKE 1 TABLET BY MOUTH ONCE A DAY    fentaNYL (DURAGESIC) 25 mcg/hr Place 1 patch onto the  skin every 72 hours.     insulin NPH-insulin regular, 70/30, (NOVOLIN 70/30) 100 unit/mL (70-30) injection Inject 90 Units into the skin 2 (two) times daily before meals. (Patient taking differently: Inject 100 Units into the skin 2 (two) times daily before meals. )    insulin regular 100 unit/mL Inj injection Inject 20 Units into the skin 3 (three) times daily before meals.    isosorbide mononitrate (IMDUR) 30 MG 24 hr tablet Take 1 tablet (30 mg total) by mouth once daily.    levothyroxine (SYNTHROID) 200 MCG tablet Take 1 tablet (200 mcg total) by mouth once daily.    losartan (COZAAR) 100 MG tablet TAKE 1 TABLET BY MOUTH EVERY DAY    meclizine (ANTIVERT) 25 mg tablet TAKE 1 TABLET BY MOUTH THREE TIMES A DAY    mometasone 0.1% (ELOCON) 0.1 % cream Apply topically once daily.    nitroGLYCERIN (NITROSTAT) 0.4 MG SL tablet Place 1 tablet (0.4 mg total) under the tongue every 5 (five) minutes as needed for Chest pain.    oxycodone-acetaminophen (PERCOCET)  mg per tablet Take 1 tablet by mouth 3 (three) times daily as needed.     pregabalin (LYRICA) 200 MG Cap Take 1 capsule (200 mg total) by mouth 3 (three) times daily.    RANEXA 500 mg Tb12 TAKE 1 TABLET BY MOUTH TWO TIMES A DAY    fenofibrate (TRICOR) 145 MG tablet TAKE 1 TABLET BY MOUTH EVERY DAY    finasteride (PROSCAR) 5 mg tablet TAKE 1 TABLET BY MOUTH ONCE A DAY    FLUoxetine (PROZAC) 40 MG capsule TAKE 1 CAPSULE BY MOUTH EVERY DAY    furosemide (LASIX) 40 MG tablet Take 1 tablet (40 mg total) by mouth once daily.    gabapentin (NEURONTIN) 800 MG tablet Take 1 tablet (800 mg total) by mouth 3 (three) times daily.     Family History     Problem Relation (Age of Onset)    Heart disease Mother        Tobacco Use    Smoking status: Never Smoker    Smokeless tobacco: Never Used   Substance and Sexual Activity    Alcohol use: No    Drug use: No    Sexual activity: Yes     Partners: Female     Review of Systems   Constitution: Negative for  malaise/fatigue, weight gain and weight loss.   HENT: Positive for congestion.    Eyes: Negative.    Cardiovascular: Positive for dyspnea on exertion and leg swelling. Negative for chest pain, irregular heartbeat, near-syncope, orthopnea, palpitations, paroxysmal nocturnal dyspnea and syncope.   Respiratory: Positive for cough, shortness of breath and wheezing.    Endocrine: Negative.    Hematologic/Lymphatic: Negative.    Musculoskeletal: Positive for arthritis, back pain and myalgias.   Gastrointestinal: Negative.    Genitourinary: Negative.    Neurological: Negative.    Psychiatric/Behavioral: Negative.      Objective:     Vital Signs (Most Recent):  Temp: 98 °F (36.7 °C) (04/18/19 0735)  Pulse: 61 (04/18/19 0757)  Resp: 18 (04/18/19 0735)  BP: (!) 152/72 (04/18/19 0735)  SpO2: 98 % (04/18/19 0941) Vital Signs (24h Range):  Temp:  [96 °F (35.6 °C)-98 °F (36.7 °C)] 98 °F (36.7 °C)  Pulse:  [56-95] 61  Resp:  [11-23] 18  SpO2:  [96 %-98 %] 98 %  BP: (116-167)/(53-73) 152/72     Weight: (!) 161.4 kg (355 lb 13.2 oz)  Body mass index is 54.1 kg/m².    SpO2: 98 %  O2 Device (Oxygen Therapy): room air      Intake/Output Summary (Last 24 hours) at 4/18/2019 1032  Last data filed at 4/18/2019 1000  Gross per 24 hour   Intake 180 ml   Output 1800 ml   Net -1620 ml       Lines/Drains/Airways     Peripheral Intravenous Line                 Peripheral IV - Single Lumen 04/18/19 0544 22 G Anterior;Right Forearm less than 1 day                Physical Exam   Constitutional: He is oriented to person, place, and time. No distress.   HENT:   Head: Atraumatic.   Eyes: Right eye exhibits no discharge. Left eye exhibits no discharge.   Cardiovascular: Normal rate, regular rhythm and normal heart sounds. Exam reveals no gallop and no friction rub.   No murmur heard.  Pulmonary/Chest: Effort normal. No accessory muscle usage. No respiratory distress. He has decreased breath sounds. He has wheezes.   Abdominal: Soft. He exhibits  distension.   Musculoskeletal: He exhibits edema.   Neurological: He is alert and oriented to person, place, and time.   Skin: Skin is warm and dry. He is not diaphoretic.       Significant Labs:   BMP:   Recent Labs   Lab 04/17/19  1138 04/18/19  0553   * 237*   * 136   K 3.8 3.8    99   CO2 23 25   BUN 25* 25*   CREATININE 1.8* 1.7*   CALCIUM 9.4 9.6   , CMP   Recent Labs   Lab 04/17/19  1138 04/18/19  0553   * 136   K 3.8 3.8    99   CO2 23 25   * 237*   BUN 25* 25*   CREATININE 1.8* 1.7*   CALCIUM 9.4 9.6   PROT 7.2  --    ALBUMIN 3.6  --    BILITOT 0.4  --    ALKPHOS 34*  --    AST 78*  --    ALT 39  --    ANIONGAP 11 12   ESTGFRAFRICA 45* 49*   EGFRNONAA 39* 42*   , CBC   Recent Labs   Lab 04/17/19  1138 04/18/19  0553   WBC 6.16 6.97   HGB 13.0* 12.8*   HCT 39.5* 38.3*    280   , INR No results for input(s): INR, PROTIME in the last 48 hours., Lipid Panel No results for input(s): CHOL, HDL, LDLCALC, TRIG, CHOLHDL in the last 48 hours., Troponin   Recent Labs   Lab 04/17/19  1402 04/17/19  1816 04/18/19  0553   TROPONINI <0.006 0.008 <0.006    and All pertinent lab results from the last 24 hours have been reviewed.    Significant Imaging: Echocardiogram:   2D echo with color flow doppler:   Results for orders placed or performed during the hospital encounter of 05/23/18   2D echo with color flow doppler   Result Value Ref Range    QEF 55 55 - 65    Diastolic Dysfunction Yes (A)     Mitral Valve Mobility NORMAL     and Transthoracic echo (TTE) complete (Cupid Only): No results found for this or any previous visit.    Assessment and Plan:     Brief HPI: Patient seen this morning on rounds, SOB improved. No chest pain overnight.     * Acute on chronic diastolic heart failure  LVEF 55% with DD  Recent dietary change- frozen meals with high sodium content  Continue BB, ARB   Diuresing with Lasix 40 mg IV BID -1.8L thus far     CKD (chronic kidney disease), stage  III  Baseline Cr 1.3-2.0  Presently 1.7- monitor closely with diuresis   Avoid nephrotoxic agents     Essential hypertension  Titrate antihypertensives for goal BP <130/80  Continue Norvasc, Coreg, Clonidine, Losartan    Morbid obesity with BMI of 45.0-49.9, adult  Weight loss encouraged, declined bariatric sx referral in the past     Coronary artery disease  Patient with reported 11 stents in the past, LAD 2006 & Lcx 2015 per records review [facility]  troponin negative x 3  EKG without acute ischemic changes  Continue DAPT, statin, BB, ACEI, Ranexa, Imdur    Stress test today          VTE Risk Mitigation (From admission, onward)        Ordered     heparin (porcine) injection 7,500 Units  Every 8 hours      04/17/19 1537     IP VTE HIGH RISK PATIENT  Once      04/17/19 1537          Mauro Asif NP  Cardiology  [facility]   throat, pain

## 2022-02-22 NOTE — ED PROVIDER NOTE - OBJECTIVE STATEMENT
50 yo male with a pmh of HLD presents c/o throat pain for 1 week. pt states to have a minimally productive cough. pt  was recently in ED and started on abx but has not been taking motrin as advised. pt denies any other symptoms including fevers, chill, headache, recent illness/travel, abdominal pain, chest pain, or SOB.

## 2022-02-23 ENCOUNTER — NON-APPOINTMENT (OUTPATIENT)
Age: 52
End: 2022-02-23

## 2022-02-25 ENCOUNTER — APPOINTMENT (OUTPATIENT)
Dept: GASTROENTEROLOGY | Facility: CLINIC | Age: 52
End: 2022-02-25

## 2022-03-01 ENCOUNTER — OUTPATIENT (OUTPATIENT)
Dept: OUTPATIENT SERVICES | Facility: HOSPITAL | Age: 52
LOS: 1 days | Discharge: HOME | End: 2022-03-01

## 2022-03-03 ENCOUNTER — EMERGENCY (EMERGENCY)
Facility: HOSPITAL | Age: 52
LOS: 0 days | Discharge: HOME | End: 2022-03-03
Attending: EMERGENCY MEDICINE | Admitting: EMERGENCY MEDICINE
Payer: MEDICAID

## 2022-03-03 VITALS
WEIGHT: 176.37 LBS | TEMPERATURE: 96 F | DIASTOLIC BLOOD PRESSURE: 74 MMHG | HEART RATE: 68 BPM | SYSTOLIC BLOOD PRESSURE: 143 MMHG | OXYGEN SATURATION: 98 % | RESPIRATION RATE: 19 BRPM | HEIGHT: 62.99 IN

## 2022-03-03 DIAGNOSIS — K02.63 DENTAL CARIES ON SMOOTH SURFACE PENETRATING INTO PULP: ICD-10-CM

## 2022-03-03 DIAGNOSIS — J02.9 ACUTE PHARYNGITIS, UNSPECIFIED: ICD-10-CM

## 2022-03-03 DIAGNOSIS — J34.89 OTHER SPECIFIED DISORDERS OF NOSE AND NASAL SINUSES: ICD-10-CM

## 2022-03-03 DIAGNOSIS — R05.9 COUGH, UNSPECIFIED: ICD-10-CM

## 2022-03-03 PROCEDURE — 99283 EMERGENCY DEPT VISIT LOW MDM: CPT

## 2022-03-03 RX ORDER — LORATADINE 10 MG/1
1 TABLET ORAL
Qty: 30 | Refills: 0
Start: 2022-03-03 | End: 2022-04-01

## 2022-03-03 RX ORDER — MOMETASONE FUROATE 50 UG/1
2 SPRAY NASAL
Qty: 1 | Refills: 0
Start: 2022-03-03 | End: 2022-03-16

## 2022-03-03 RX ORDER — DIPHENHYDRAMINE HCL 50 MG
50 CAPSULE ORAL ONCE
Refills: 0 | Status: COMPLETED | OUTPATIENT
Start: 2022-03-03 | End: 2022-03-03

## 2022-03-03 RX ADMIN — Medication 50 MILLIGRAM(S): at 22:01

## 2022-03-03 NOTE — ED PROVIDER NOTE - CLINICAL SUMMARY MEDICAL DECISION MAKING FREE TEXT BOX
51-year-old male previously healthy presents with feeling sore and itchy throat as well as some rhinorrhea and a mild dry cough x2 weeks. Improved with course of Augmentin and Decadron given and 1.5 weeks ago, however has returned. Patient also notes given having itchy eyes and runny eyes. Denies fever, vomiting, and all other symptoms. On exam, afebrile, hemodynamically stable, saturating well, NAD, well appearing, sitting comfortably in chair, no WOB, speaking full sentences, head NCAT, PERRL, EOMI, noted watery eyes, anicteric, MMM, uvula midline, airway widely patent, no stridor/salivary pooling/hoarseness, no peritonsillar swelling, noted diffuse erythema with no lesions/exudates, TMs clear with sharp reflex bilaterally, RRR, nml S1/S2, no m/r/g, lungs CTAB, no w/r/r, AAO, CN's 3-12 grossly intact, MAHONEY spontaneously, no leg cyanosis or edema, skin warm, well perfused, no rashes or hives. No evidence of OM, PTA, RPA. Exam low suspicion for strep. Character/exam and watery eyes and rhinorrhea could be consistent with allergic etiology. No e/o airway or systemic involvement. Instructed in medication for allergy use. Patient is well appearing, NAD, afebrile, hemodynamically stable. Discharged with instructions in further symptomatic care, strict return precautions.

## 2022-03-03 NOTE — ED PROVIDER NOTE - OBJECTIVE STATEMENT
51 y.o. male no pmh here for continued throat discomfort, dry cough and itchy runny eyes. + pollen allergies.  + rhinorrhea with post nasal drip. Is s/p augmentin course, dex in ER. Takes motrin daily. Sore throat worsened at night.

## 2022-03-03 NOTE — ED PROVIDER NOTE - PHYSICAL EXAMINATION
Physical Exam    Vital Signs: I have reviewed the initial vital signs.  Constitutional: well-nourished, appears stated age, no acute distress  Eyes: Conjunctiva pink, Sclera clear, PERRLA, EOMI, no ptosis, no entrapment, No purulence  Mouth no area of fluctuance or induration, uvula midline no tonsillar exudates   Cardiovascular: S1 and S2, regular rate  Respiratory: unlabored respiratory effort, speaking in full sentences, handling oral secretions, clear to auscultation bilaterally no wheezing, rales and rhonchi  Integumentary: warm, dry, no rashes, lacerations,  Neurologic: awake, alert

## 2022-03-03 NOTE — ED PROVIDER NOTE - NSFOLLOWUPINSTRUCTIONS_ED_ALL_ED_FT
MAKE AN APPOINTMENT WITH THE ALLERGIST WITHIN 1 - 3 DAYS OF YOUR ER VISIT.    USE YOUR NEW MEDICATIONS AS DIRECTED FOR ALLERGY CONTROL.    General Allergic Reaction    WHAT YOU NEED TO KNOW:    An allergic reaction is your body's response to an allergen. Allergens include medicines, food, insect stings, animal dander, mold, latex, chemicals, and dust mites. Pollen from trees, grass, and weeds can also cause an allergic reaction. An allergic reaction can range from mild to severe.    DISCHARGE INSTRUCTIONS:    Call 911 for signs or symptoms of anaphylaxis, such as trouble breathing, swelling in your mouth or throat, or wheezing. You may also have itching, a rash, hives, or feel like you are going to faint.    Return to the emergency department if:     You have a skin rash, hives, swelling, or itching that is starting to get worse.      Your throat tightens, or your lips or tongue swell.      You have trouble swallowing or speaking.      You have worsening nausea, diarrhea, or abdominal cramps, or you are vomiting.      You have chest pain or tightness.    Contact your healthcare provider if:     You have questions or concerns about your condition or care.        Medicines: You may need any of the following:     Medicines may be given to relieve certain allergy symptoms such as itching, sneezing, and swelling. You may take them as a pill or use drops in your nose or eyes. Topical treatments may be given to put directly on your skin to help decrease itching or swelling.      Epinephrine may be prescribed if you are at risk for anaphylaxis. This is a severe allergic reaction that can be life-threatening. Your healthcare provider will tell you if you need to keep epinephrine with you. You will be taught when and how to use it.      Take your medicine as directed. Contact your healthcare provider if you think your medicine is not helping or if you have side effects. Tell him of her if you are allergic to any medicine. Keep a list of the medicines, vitamins, and herbs you take. Include the amounts, and when and why you take them. Bring the list or the pill bottles to follow-up visits. Carry your medicine list with you in case of an emergency.    Follow up with your healthcare provider as directed: Write down your questions so you remember to ask them during your visits.     Manage your symptoms:     Avoid allergens. You may need to have allergy testing with your healthcare provider or a specialist to find your allergens.      Use cold compresses on your skin or eyes. This will help soothe skin or eyes affected by the allergic reaction. You can make a cold compress by soaking a washcloth in cool water. Wring out the extra water before you apply the washcloth.      Rinse your nasal passages with a saline solution. Daily rinsing may help clear allergens out of your nose. Use distilled water if possible. You can also boil tap water and then let it cool before you use it. Do not use tap water without boiling it first.      Do not smoke. Nicotine and other chemicals in cigarettes and cigars can make an allergic reaction worse, and can also cause lung damage. Ask your healthcare provider for information if you currently smoke and need help to quit. E-cigarettes or smokeless tobacco still contain nicotine. Talk to your healthcare provider before you use these products.          © Copyright [employer] 2019 All illustrations and images included in CareNotes are the copyrighted property of [employer].A[employer]M., [employer] or [employer].

## 2022-03-03 NOTE — ED ADULT NURSE NOTE - CHIEF COMPLAINT QUOTE
Detail Level: Simple Plan: Discussed Blue light treatment.  Patient reports \"did not like 5 FU treatment in the past\" Pt came c/o sore throat x 9 days, was in ER 9 days ago but pain never went away.

## 2022-03-03 NOTE — ED PROVIDER NOTE - NSCAREINITIATED _GEN_ER
2020     Miek Bland (:  1951) is a 79 y.o. male, here for evaluation of the following medical concerns: HTN, hyperlipidemia, ASCVD, and consipation.      I last seen Gian Escudero 6 months ago. He follows routinely with Dr. Ball Kras falls or hospitalizations since last visit.      ASCVD/HTN-  CABG approximately 18 years ago. He is taking medications as instructed, with no medication side effects. Patient does not perform home BP monitoring. Denies dyspnea on exertion, swelling of ankles, palpitations, and orthostatic dizziness or lightheadedness. Denies any increased fatigue. BP today in office 120/68. Following with Dr. Tucker Oconnell routinely. Last cardiac catheterization in 2019. Stent to  maker. Chest pain has since subsided. DAPT for 1 year, aggressive risk factor modification, lipid-lowering therapy, and cardiac rehab has been completed.      Hyperlipidemia-  Gian Escudero reports taking atorvastatin 40 mg daily as ordered. Lifestyle modifications including heart healthy diet, weight loss and exercise are being followed.  Last lipid profile shows LDL 60 < 100 (<70 with CVD), triglycerides 60 <150, HDL 47 >40 (male, HDL >50 female) in 2020.      Gian Escudero is also following with Dr. Debra Julien for pain management, and Dr. Kellie Burleson for throat cancer. Last radiation treatment 2 years ago.     Gian Escudero reports he has been experiencing some constipation over the last 2-3 months. Stools are hard. No specific aggravating or alleviating factors. Is up to date on GI screenings. Review of Systems   Constitutional: Negative for chills, fatigue, fever and unexpected weight change. HENT: Positive for trouble swallowing (intermittently since throat cancer). Negative for congestion, ear pain, sinus pressure, sinus pain and tinnitus. Eyes: Negative for photophobia and visual disturbance. Respiratory: Negative for cough, choking, shortness of breath and wheezing. Cardiovascular: Negative for chest pain.    Gastrointestinal: Negative for abdominal distention, abdominal pain, blood in stool, constipation, diarrhea, nausea and vomiting. Endocrine: Negative for cold intolerance and heat intolerance. Genitourinary: Negative for difficulty urinating, dysuria, frequency, hematuria and urgency. Musculoskeletal: Positive for arthralgias (left hip pain), back pain (chronic) and neck pain (chronic). Negative for myalgias. Skin: Negative for rash and wound. Neurological: Negative for dizziness, light-headedness and headaches. Psychiatric/Behavioral: Negative for sleep disturbance. Prior to Visit Medications    Medication Sig Taking? Authorizing Provider   docusate sodium (COLACE) 100 MG capsule Take 1 capsule by mouth 2 times daily as needed for Constipation Yes DEA Pozo CNP   atorvastatin (LIPITOR) 40 MG tablet Take 1 tablet by mouth daily Yes Brenda Rea MD   terazosin (HYTRIN) 5 MG capsule TAKE 1 CAPSULE NIGHTLY Yes DEA Pozo CNP   gabapentin (NEURONTIN) 300 MG capsule Take 1 capsule by mouth 3 times daily for 180 days. Intended supply: 90 days Yes Joseph Ibarra MD   levothyroxine (SYNTHROID) 50 MCG tablet Take 1 tablet by mouth daily Yes NO Alberto   metoprolol succinate (TOPROL XL) 50 MG extended release tablet Take 1 tablet by mouth 2 times daily Yes Ridge Dietz PA-C   ticagrelor (BRILINTA) 90 MG TABS tablet Take 1 tablet by mouth 2 times daily Yes Roland Meadows PA-C   ipratropium (ATROVENT) 0.06 % nasal spray 2 sprays by Nasal route 3 times daily Yes Cheryl Reyes PA-C   nitroGLYCERIN (NITROSTAT) 0.4 MG SL tablet up to max of 3 total doses. If no relief after 1 dose, call 911.  Yes DEA Lam CNP   diphenhydrAMINE-APAP, sleep, (TYLENOL PM EXTRA STRENGTH)  MG tablet Take 2 tablets by mouth nightly Yes Historical Provider, MD   acetaminophen (TYLENOL) 500 MG tablet Take 500 mg by mouth every 6 hours as needed for Pain Yes Historical Provider, MD   omeprazole (PRILOSEC) 20 MG delayed release capsule Take 20 mg by mouth 2 times daily Yes Historical Provider, MD   aspirin EC 81 MG EC tablet Take 81 mg by mouth daily Yes Historical Provider, MD   CPAP Machine MISC by Does not apply route Please change CPAP pressure to 8 cm H20. Yes Ulices Hoang PA-C   Coenzyme Q10 (CO Q-10) 200 MG CAPS Take  by mouth daily. Yes Historical Provider, MD   Multiple Vitamin (MULTI-VITAMIN) TABS Take  by mouth daily. Yes Historical Provider, MD   ARGININE PO Take 500 mg by mouth daily. 2 tab Yes Historical Provider, MD        Social History     Tobacco Use    Smoking status: Former Smoker     Packs/day: 2.00     Years: 20.00     Pack years: 40.00     Last attempt to quit: 2001     Years since quittin.5    Smokeless tobacco: Never Used   Substance Use Topics    Alcohol use: No     Alcohol/week: 0.0 standard drinks        Vitals:    20 1021   BP: 120/68   Site: Right Upper Arm   Cuff Size: Medium Adult   Pulse: 64   Temp: 97.7 °F (36.5 °C)   Weight: 188 lb (85.3 kg)   Height: 5' 10\" (1.778 m)     Estimated body mass index is 26.98 kg/m² as calculated from the following:    Height as of this encounter: 5' 10\" (1.778 m). Weight as of this encounter: 188 lb (85.3 kg). Physical Exam  Constitutional:       General: He is not in acute distress. Appearance: Normal appearance. He is well-developed. HENT:      Head: Normocephalic and atraumatic. Right Ear: External ear normal.      Left Ear: External ear normal.      Nose: Nose normal.   Eyes:      Conjunctiva/sclera: Conjunctivae normal.      Pupils: Pupils are equal, round, and reactive to light. Neck:      Musculoskeletal: Normal range of motion and neck supple. Thyroid: No thyromegaly. Vascular: No JVD. Cardiovascular:      Rate and Rhythm: Regular rhythm. Heart sounds: Normal heart sounds. No murmur. No friction rub. No gallop.     Pulmonary:      Effort: Iesha Perez(Resident)

## 2022-03-03 NOTE — ED PROVIDER NOTE - PATIENT PORTAL LINK FT
You can access the FollowMyHealth Patient Portal offered by [facility] by registering at the following website: [email or web address]. By joining [facility]’s FollowMyHealth portal, you will also be able to view your health information using other applications (apps) compatible with our system.

## 2022-03-04 ENCOUNTER — NON-APPOINTMENT (OUTPATIENT)
Age: 52
End: 2022-03-04

## 2022-03-11 ENCOUNTER — OUTPATIENT (OUTPATIENT)
Dept: OUTPATIENT SERVICES | Facility: HOSPITAL | Age: 52
LOS: 1 days | Discharge: HOME | End: 2022-03-11

## 2022-03-11 ENCOUNTER — APPOINTMENT (OUTPATIENT)
Dept: INTERNAL MEDICINE | Facility: CLINIC | Age: 52
End: 2022-03-11
Payer: COMMERCIAL

## 2022-03-11 DIAGNOSIS — R09.82 POSTNASAL DRIP: ICD-10-CM

## 2022-03-11 DIAGNOSIS — E78.5 HYPERLIPIDEMIA, UNSPECIFIED: ICD-10-CM

## 2022-03-11 PROCEDURE — 99214 OFFICE O/P EST MOD 30 MIN: CPT | Mod: GC

## 2022-03-11 RX ORDER — FLUTICASONE PROPIONATE 50 UG/1
50 SPRAY, METERED NASAL DAILY
Qty: 1 | Refills: 6 | Status: ACTIVE | OUTPATIENT
Start: 2022-03-11

## 2022-03-11 RX ORDER — LORATADINE 10 MG/1
10 TABLET ORAL
Qty: 30 | Refills: 3 | Status: ACTIVE | OUTPATIENT
Start: 2022-03-11

## 2022-03-11 NOTE — REVIEW OF SYSTEMS
25 [Recent Change In Weight] : ~T recent weight change [Postnasal Drip] : postnasal drip [Nasal Discharge] : nasal discharge [Sore Throat] : sore throat [Cough] : cough [Negative] : Heme/Lymph

## 2022-03-11 NOTE — PHYSICAL EXAM
[No Acute Distress] : no acute distress [Well Nourished] : well nourished [Well Developed] : well developed [Well-Appearing] : well-appearing [Normal Sclera/Conjunctiva] : normal sclera/conjunctiva [PERRL] : pupils equal round and reactive to light [EOMI] : extraocular movements intact [Normal Outer Ear/Nose] : the outer ears and nose were normal in appearance [Normal Oropharynx] : the oropharynx was normal [No JVD] : no jugular venous distention [No Lymphadenopathy] : no lymphadenopathy [Supple] : supple [Thyroid Normal, No Nodules] : the thyroid was normal and there were no nodules present [No Respiratory Distress] : no respiratory distress  [No Accessory Muscle Use] : no accessory muscle use [Clear to Auscultation] : lungs were clear to auscultation bilaterally [Normal Rate] : normal rate  [Regular Rhythm] : with a regular rhythm [Normal S1, S2] : normal S1 and S2 [No Murmur] : no murmur heard [No Carotid Bruits] : no carotid bruits [No Abdominal Bruit] : a ~M bruit was not heard ~T in the abdomen [No Varicosities] : no varicosities [Pedal Pulses Present] : the pedal pulses are present [No Edema] : there was no peripheral edema [No Palpable Aorta] : no palpable aorta [No Extremity Clubbing/Cyanosis] : no extremity clubbing/cyanosis [Soft] : abdomen soft [Non Tender] : non-tender [Non-distended] : non-distended [No Masses] : no abdominal mass palpated [No HSM] : no HSM [Normal Bowel Sounds] : normal bowel sounds [Normal Posterior Cervical Nodes] : no posterior cervical lymphadenopathy [Normal Anterior Cervical Nodes] : no anterior cervical lymphadenopathy [No CVA Tenderness] : no CVA  tenderness [No Spinal Tenderness] : no spinal tenderness [No Joint Swelling] : no joint swelling [Grossly Normal Strength/Tone] : grossly normal strength/tone [No Rash] : no rash [Coordination Grossly Intact] : coordination grossly intact [No Focal Deficits] : no focal deficits [Normal Gait] : normal gait [Deep Tendon Reflexes (DTR)] : deep tendon reflexes were 2+ and symmetric [Normal Affect] : the affect was normal [Normal Insight/Judgement] : insight and judgment were intact [de-identified] : grey boggy turbinates

## 2022-03-11 NOTE — HISTORY OF PRESENT ILLNESS
[FreeTextEntry1] : follow up for dry cough, itchy, runny nose [de-identified] : 51 y.o man with no previous medical hx is here for follow up of dry cough and sore throat and recent weight gain of 20 kgs over the last year. Patient has been to ED twice for sore throat and was prescribed antibiotics that did not resolve his symtoms. \par he said that Dr jimenez told him that he has thyroid problem but no TSH was performed at that time. TSH 8.02.\par he denies any cold intolerance, no fatigue, no dry skin, no hair loss, no constipation, no bradycardia and no depression \par he denies any polyuria, no polydipsia

## 2022-03-11 NOTE — ASSESSMENT
[FreeTextEntry1] : 51 y.o man with history of hypothyroidism, hyperlipidemia, presents with complaints of dry cough, itchy throat and runny nose, went to ED x2 with no resolution of symptoms. Also recent weight gain.\par \par #Post nasal drip\par #Rule out Polyp\par - Patient with months of dry cough, running nose, two presentations to ED\par - No resolution of symptoms with antibiotics, antihistamine or nasal spray\par - Continue  loratadine 10 qd, Flonase spray\par - ENT referral to rule out polyp \par \par #Hypothyroidism \par # weight gain:\par - TSH 8.02\par - Start Synthroid 25mg PO QD, \par - F/U in 3 months for repeat TSH, T4, T3\par \par #Hyperlipemia \par - likely related to hypothyroidism\par - Start Atorvastatin 40mg PO QD\par - Repeat lipid panel in 3 months\par \par # HCM:\par - refer to GI for colonoscopy\par - f/u in 3 months for medication refills and TSH, lipid levels

## 2022-03-22 DIAGNOSIS — R09.82 POSTNASAL DRIP: ICD-10-CM

## 2022-03-22 DIAGNOSIS — E78.5 HYPERLIPIDEMIA, UNSPECIFIED: ICD-10-CM

## 2022-03-22 DIAGNOSIS — E03.9 HYPOTHYROIDISM, UNSPECIFIED: ICD-10-CM

## 2022-03-22 DIAGNOSIS — Z00.00 ENCOUNTER FOR GENERAL ADULT MEDICAL EXAMINATION WITHOUT ABNORMAL FINDINGS: ICD-10-CM

## 2022-05-04 ENCOUNTER — APPOINTMENT (OUTPATIENT)
Dept: INTERNAL MEDICINE | Facility: CLINIC | Age: 52
End: 2022-05-04

## 2022-05-04 ENCOUNTER — APPOINTMENT (OUTPATIENT)
Dept: NUTRITION | Facility: CLINIC | Age: 52
End: 2022-05-04

## 2022-05-20 ENCOUNTER — APPOINTMENT (OUTPATIENT)
Dept: GASTROENTEROLOGY | Facility: CLINIC | Age: 52
End: 2022-05-20

## 2022-08-05 ENCOUNTER — APPOINTMENT (OUTPATIENT)
Dept: GASTROENTEROLOGY | Facility: CLINIC | Age: 52
End: 2022-08-05

## 2022-08-11 ENCOUNTER — OUTPATIENT (OUTPATIENT)
Dept: OUTPATIENT SERVICES | Facility: HOSPITAL | Age: 52
LOS: 1 days | End: 2022-08-11

## 2022-08-11 ENCOUNTER — OUTPATIENT (OUTPATIENT)
Dept: OUTPATIENT SERVICES | Facility: HOSPITAL | Age: 52
LOS: 1 days | Discharge: HOME | End: 2022-08-11

## 2022-08-11 DIAGNOSIS — K02.63 DENTAL CARIES ON SMOOTH SURFACE PENETRATING INTO PULP: ICD-10-CM

## 2022-09-20 ENCOUNTER — OUTPATIENT (OUTPATIENT)
Dept: OUTPATIENT SERVICES | Facility: HOSPITAL | Age: 52
LOS: 1 days | Discharge: HOME | End: 2022-09-20

## 2022-09-20 ENCOUNTER — APPOINTMENT (OUTPATIENT)
Dept: OPHTHALMOLOGY | Facility: CLINIC | Age: 52
End: 2022-09-20

## 2022-09-20 PROCEDURE — 92014 COMPRE OPH EXAM EST PT 1/>: CPT

## 2022-10-27 ENCOUNTER — OUTPATIENT (OUTPATIENT)
Dept: OUTPATIENT SERVICES | Facility: HOSPITAL | Age: 52
LOS: 1 days | End: 2022-10-27

## 2022-10-27 DIAGNOSIS — K02.63 DENTAL CARIES ON SMOOTH SURFACE PENETRATING INTO PULP: ICD-10-CM

## 2022-11-03 ENCOUNTER — RESULT REVIEW (OUTPATIENT)
Age: 52
End: 2022-11-03

## 2022-11-03 ENCOUNTER — NON-APPOINTMENT (OUTPATIENT)
Age: 52
End: 2022-11-03

## 2022-11-03 ENCOUNTER — OUTPATIENT (OUTPATIENT)
Dept: OUTPATIENT SERVICES | Facility: HOSPITAL | Age: 52
LOS: 1 days | Discharge: HOME | End: 2022-11-03

## 2022-11-03 ENCOUNTER — APPOINTMENT (OUTPATIENT)
Dept: UROLOGY | Facility: CLINIC | Age: 52
End: 2022-11-03

## 2022-11-03 VITALS
HEIGHT: 67 IN | SYSTOLIC BLOOD PRESSURE: 117 MMHG | DIASTOLIC BLOOD PRESSURE: 78 MMHG | HEART RATE: 61 BPM | OXYGEN SATURATION: 97 % | BODY MASS INDEX: 28.25 KG/M2 | TEMPERATURE: 97.5 F | WEIGHT: 180 LBS

## 2022-11-03 DIAGNOSIS — R30.0 DYSURIA: ICD-10-CM

## 2022-11-03 DIAGNOSIS — R39.9 UNSPECIFIED SYMPTOMS AND SIGNS INVOLVING THE GENITOURINARY SYSTEM: ICD-10-CM

## 2022-11-03 DIAGNOSIS — R35.0 FREQUENCY OF MICTURITION: ICD-10-CM

## 2022-11-03 DIAGNOSIS — Z86.39 PERSONAL HISTORY OF OTHER ENDOCRINE, NUTRITIONAL AND METABOLIC DISEASE: ICD-10-CM

## 2022-11-03 PROCEDURE — 99204 OFFICE O/P NEW MOD 45 MIN: CPT

## 2022-11-03 NOTE — PHYSICAL EXAM
[General Appearance - Well Developed] : well developed [General Appearance - Well Nourished] : well nourished [Normal Appearance] : normal appearance [Well Groomed] : well groomed [General Appearance - In No Acute Distress] : no acute distress [Heart Rate And Rhythm] : Heart rate and rhythm were normal [] : no respiratory distress [Exaggerated Use Of Accessory Muscles For Inspiration] : no accessory muscle use [Abdomen Soft] : soft [Abdomen Tenderness] : non-tender [Costovertebral Angle Tenderness] : no ~M costovertebral angle tenderness [Urethral Meatus] : meatus normal [Penis Abnormality] : normal uncircumcised penis [Testes Tenderness] : no tenderness of the testes [Testes Mass (___cm)] : there were no testicular masses

## 2022-11-03 NOTE — ASSESSMENT
[FreeTextEntry1] : Pt. is 52 y.o M with PMH of HLD  denies taking any medications comes to Urology clinic to "evaluate prostate " c/o frequency, urgency, mild dysuria, intermittent RLQ pain " when drink too much coffee and not able to go to the bathroom on time" . reports nocturia 2-3 times/night. \par Pt.  denies fever chills, nausea, vomiting,  hematuria.\par \par \par Plan:\par UA\par Urine cx \par RBUS \par PSA Total and Free \par F/U in 4 weeks

## 2022-11-03 NOTE — REVIEW OF SYSTEMS
[Abdominal Pain] : abdominal pain [see HPI] : see HPI [Dysuria] : dysuria [Nocturia] : nocturia [Fever] : no fever [Chills] : no chills [Testicular Pain] : no testicular pain

## 2022-11-03 NOTE — HISTORY OF PRESENT ILLNESS
[FreeTextEntry1] : Pt. is 52 y.o M with PMH of HLD  denies taking any medications comes to Urology clinic to "evaluate prostate " c/o frequency, urgency, mild dysuria, intermittent RLQ pain " when drink too much coffee and not able to go to the bathroom on time" . reports nocturia 2-3 times/night. \par Pt.  denies fever chills, nausea, vomiting,  hematuria.

## 2022-11-14 ENCOUNTER — OUTPATIENT (OUTPATIENT)
Dept: OUTPATIENT SERVICES | Facility: HOSPITAL | Age: 52
LOS: 1 days | Discharge: HOME | End: 2022-11-14

## 2022-11-17 DIAGNOSIS — K02.63 DENTAL CARIES ON SMOOTH SURFACE PENETRATING INTO PULP: ICD-10-CM

## 2022-11-18 LAB
APPEARANCE: CLEAR
BILIRUBIN URINE: NEGATIVE
BLOOD URINE: NEGATIVE
COLOR: NORMAL
GLUCOSE QUALITATIVE U: NEGATIVE
KETONES URINE: NEGATIVE
LEUKOCYTE ESTERASE URINE: NEGATIVE
NITRITE URINE: NEGATIVE
PH URINE: 6.5
PROTEIN URINE: NEGATIVE
PSA FREE FLD-MCNC: 13 %
PSA FREE SERPL-MCNC: 0.12 NG/ML
PSA SERPL-MCNC: 0.95 NG/ML
SPECIFIC GRAVITY URINE: 1.02
UROBILINOGEN URINE: NORMAL

## 2022-11-22 ENCOUNTER — OUTPATIENT (OUTPATIENT)
Dept: OUTPATIENT SERVICES | Facility: HOSPITAL | Age: 52
LOS: 1 days | Discharge: HOME | End: 2022-11-22

## 2022-11-22 DIAGNOSIS — R30.0 DYSURIA: ICD-10-CM

## 2022-11-22 DIAGNOSIS — R35.0 FREQUENCY OF MICTURITION: ICD-10-CM

## 2022-11-22 PROCEDURE — 76770 US EXAM ABDO BACK WALL COMP: CPT | Mod: 26

## 2022-12-13 ENCOUNTER — OUTPATIENT (OUTPATIENT)
Dept: OUTPATIENT SERVICES | Facility: HOSPITAL | Age: 52
LOS: 1 days | Discharge: HOME | End: 2022-12-13

## 2022-12-15 ENCOUNTER — APPOINTMENT (OUTPATIENT)
Dept: UROLOGY | Facility: CLINIC | Age: 52
End: 2022-12-15
Payer: SUBSIDIZED

## 2022-12-15 ENCOUNTER — OUTPATIENT (OUTPATIENT)
Dept: OUTPATIENT SERVICES | Facility: HOSPITAL | Age: 52
LOS: 1 days | Discharge: HOME | End: 2022-12-15

## 2022-12-15 VITALS
HEIGHT: 67 IN | SYSTOLIC BLOOD PRESSURE: 105 MMHG | BODY MASS INDEX: 29.03 KG/M2 | TEMPERATURE: 97.2 F | WEIGHT: 185 LBS | DIASTOLIC BLOOD PRESSURE: 71 MMHG | HEART RATE: 77 BPM | OXYGEN SATURATION: 98 %

## 2022-12-15 DIAGNOSIS — R35.0 FREQUENCY OF MICTURITION: ICD-10-CM

## 2022-12-15 DIAGNOSIS — R39.9 UNSPECIFIED SYMPTOMS AND SIGNS INVOLVING THE GENITOURINARY SYSTEM: ICD-10-CM

## 2022-12-15 PROCEDURE — 99213 OFFICE O/P EST LOW 20 MIN: CPT

## 2022-12-22 PROBLEM — R39.9 LOWER URINARY TRACT SYMPTOMS (LUTS): Status: ACTIVE | Noted: 2022-11-03

## 2022-12-22 PROBLEM — R35.0 URINARY FREQUENCY: Status: ACTIVE | Noted: 2022-11-03

## 2022-12-22 NOTE — HISTORY OF PRESENT ILLNESS
[FreeTextEntry1] : Pt is a 51y/o M returning to clinic for discussion of lab results. He was seen on initial visit for evaluation of LUTS, which included nocturia x2-3, frequency, dysuria. He reports slight improvement in his symptoms after not drinking water/coffee after a certain time before he goes to sleep. \par \par PSA - 0.95\par PSA, Free - 0.12\par UA - negative \par RBUS - Prostate size 33cc. Prevoid 302cc, postvoid 54cc. No hydro or visible stones.

## 2022-12-22 NOTE — ASSESSMENT
[FreeTextEntry1] : Pt is a 51y/o M returning to clinic for discussion of lab results. He was seen on initial visit for evaluation of LUTS, which included nocturia x2-3, frequency, dysuria. He reports slight improvement in his symptoms after not drinking water/coffee after a certain time before he goes to sleep.   Total time=20 min\par \par PSA - 0.95 \par PSA, Free - 0.12 \par UA - negative\par  RBUS - Prostate size 33cc. Prevoid 302cc, postvoid 54cc. No hydro or visible stones. \par \par Plan:\par - F/u in 6mo or as needed. \par - Seen with PHOEBE Sifuentes

## 2023-01-17 ENCOUNTER — OUTPATIENT (OUTPATIENT)
Dept: OUTPATIENT SERVICES | Facility: HOSPITAL | Age: 53
LOS: 1 days | Discharge: HOME | End: 2023-01-17

## 2023-01-17 ENCOUNTER — APPOINTMENT (OUTPATIENT)
Dept: OPHTHALMOLOGY | Facility: CLINIC | Age: 53
End: 2023-01-17
Payer: COMMERCIAL

## 2023-01-17 PROCEDURE — 92014 COMPRE OPH EXAM EST PT 1/>: CPT

## 2023-02-15 ENCOUNTER — APPOINTMENT (OUTPATIENT)
Dept: GASTROENTEROLOGY | Facility: CLINIC | Age: 53
End: 2023-02-15

## 2023-02-15 ENCOUNTER — APPOINTMENT (OUTPATIENT)
Age: 53
End: 2023-02-15

## 2023-02-28 ENCOUNTER — APPOINTMENT (OUTPATIENT)
Dept: OPHTHALMOLOGY | Facility: CLINIC | Age: 53
End: 2023-02-28

## 2023-04-27 ENCOUNTER — OUTPATIENT (OUTPATIENT)
Dept: OUTPATIENT SERVICES | Facility: HOSPITAL | Age: 53
LOS: 1 days | End: 2023-04-27
Payer: COMMERCIAL

## 2023-04-27 DIAGNOSIS — K02.9 DENTAL CARIES, UNSPECIFIED: ICD-10-CM

## 2023-04-27 DIAGNOSIS — K02.63 DENTAL CARIES ON SMOOTH SURFACE PENETRATING INTO PULP: ICD-10-CM

## 2023-04-27 PROCEDURE — D6752: CPT

## 2023-05-09 ENCOUNTER — APPOINTMENT (OUTPATIENT)
Dept: OPHTHALMOLOGY | Facility: CLINIC | Age: 53
End: 2023-05-09
Payer: COMMERCIAL

## 2023-05-09 ENCOUNTER — OUTPATIENT (OUTPATIENT)
Dept: OUTPATIENT SERVICES | Facility: HOSPITAL | Age: 53
LOS: 1 days | End: 2023-05-09
Payer: COMMERCIAL

## 2023-05-09 DIAGNOSIS — H53.8 OTHER VISUAL DISTURBANCES: ICD-10-CM

## 2023-05-09 PROCEDURE — 92134 CPTRZ OPH DX IMG PST SGM RTA: CPT | Mod: 26

## 2023-05-09 PROCEDURE — 92014 COMPRE OPH EXAM EST PT 1/>: CPT

## 2023-05-09 PROCEDURE — 92012 INTRM OPH EXAM EST PATIENT: CPT

## 2023-05-12 DIAGNOSIS — H25.13 AGE-RELATED NUCLEAR CATARACT, BILATERAL: ICD-10-CM

## 2023-05-12 DIAGNOSIS — E11.3299 TYPE 2 DIABETES MELLITUS WITH MILD NONPROLIFERATIVE DIABETIC RETINOPATHY WITHOUT MACULAR EDEMA, UNSPECIFIED EYE: ICD-10-CM

## 2023-05-12 DIAGNOSIS — H04.123 DRY EYE SYNDROME OF BILATERAL LACRIMAL GLANDS: ICD-10-CM

## 2023-05-12 DIAGNOSIS — H35.363 DRUSEN (DEGENERATIVE) OF MACULA, BILATERAL: ICD-10-CM

## 2023-05-12 DIAGNOSIS — H11.053 PERIPHERAL PTERYGIUM, PROGRESSIVE, BILATERAL: ICD-10-CM

## 2023-05-25 ENCOUNTER — OUTPATIENT (OUTPATIENT)
Dept: OUTPATIENT SERVICES | Facility: HOSPITAL | Age: 53
LOS: 1 days | End: 2023-05-25
Payer: COMMERCIAL

## 2023-05-25 DIAGNOSIS — K08.409 PARTIAL LOSS OF TEETH, UNSPECIFIED CAUSE, UNSPECIFIED CLASS: ICD-10-CM

## 2023-05-25 PROCEDURE — D6242: CPT

## 2023-05-26 DIAGNOSIS — K08.409 PARTIAL LOSS OF TEETH, UNSPECIFIED CAUSE, UNSPECIFIED CLASS: ICD-10-CM

## 2023-06-07 ENCOUNTER — OUTPATIENT (OUTPATIENT)
Dept: OUTPATIENT SERVICES | Facility: HOSPITAL | Age: 53
LOS: 1 days | End: 2023-06-07
Payer: COMMERCIAL

## 2023-06-07 DIAGNOSIS — K02.9 DENTAL CARIES, UNSPECIFIED: ICD-10-CM

## 2023-06-07 DIAGNOSIS — K02.63 DENTAL CARIES ON SMOOTH SURFACE PENETRATING INTO PULP: ICD-10-CM

## 2023-06-07 PROCEDURE — D0220: CPT

## 2023-06-07 PROCEDURE — D0140: CPT

## 2023-06-08 ENCOUNTER — OUTPATIENT (OUTPATIENT)
Dept: OUTPATIENT SERVICES | Facility: HOSPITAL | Age: 53
LOS: 1 days | End: 2023-06-08

## 2023-06-08 DIAGNOSIS — K08.409 PARTIAL LOSS OF TEETH, UNSPECIFIED CAUSE, UNSPECIFIED CLASS: ICD-10-CM

## 2023-06-08 DIAGNOSIS — K02.9 DENTAL CARIES, UNSPECIFIED: ICD-10-CM

## 2023-06-15 ENCOUNTER — OUTPATIENT (OUTPATIENT)
Dept: OUTPATIENT SERVICES | Facility: HOSPITAL | Age: 53
LOS: 1 days | End: 2023-06-15
Payer: COMMERCIAL

## 2023-06-15 ENCOUNTER — APPOINTMENT (OUTPATIENT)
Dept: UROLOGY | Facility: CLINIC | Age: 53
End: 2023-06-15

## 2023-06-15 DIAGNOSIS — K02.9 DENTAL CARIES, UNSPECIFIED: ICD-10-CM

## 2023-06-15 PROCEDURE — D0220: CPT

## 2023-06-15 PROCEDURE — D0140: CPT

## 2023-06-22 ENCOUNTER — OUTPATIENT (OUTPATIENT)
Dept: OUTPATIENT SERVICES | Facility: HOSPITAL | Age: 53
LOS: 1 days | End: 2023-06-22

## 2023-06-22 DIAGNOSIS — K08.409 PARTIAL LOSS OF TEETH, UNSPECIFIED CAUSE, UNSPECIFIED CLASS: ICD-10-CM

## 2023-06-27 ENCOUNTER — OUTPATIENT (OUTPATIENT)
Dept: OUTPATIENT SERVICES | Facility: HOSPITAL | Age: 53
LOS: 1 days | End: 2023-06-27
Payer: COMMERCIAL

## 2023-06-27 DIAGNOSIS — K02.7 DENTAL ROOT CARIES: ICD-10-CM

## 2023-06-27 PROCEDURE — D3330: CPT

## 2023-06-28 DIAGNOSIS — K02.7 DENTAL ROOT CARIES: ICD-10-CM

## 2023-06-29 DIAGNOSIS — K04.4 ACUTE APICAL PERIODONTITIS OF PULPAL ORIGIN: ICD-10-CM

## 2023-06-29 DIAGNOSIS — K02.9 DENTAL CARIES, UNSPECIFIED: ICD-10-CM

## 2023-07-11 ENCOUNTER — OUTPATIENT (OUTPATIENT)
Dept: OUTPATIENT SERVICES | Facility: HOSPITAL | Age: 53
LOS: 1 days | End: 2023-07-11

## 2023-07-11 DIAGNOSIS — K02.7 DENTAL ROOT CARIES: ICD-10-CM

## 2023-07-26 DIAGNOSIS — K02.9 DENTAL CARIES, UNSPECIFIED: ICD-10-CM

## 2023-07-26 NOTE — ED ADULT NURSE NOTE - CAS DISCH ACCOMP BY
Subjective:       Patient ID: Caryn Sparks is a 15 y.o. female.    Chief Complaint: Follow-up    HPI  Review of Systems   Constitutional:  Negative for activity change, appetite change, fever and unexpected weight change.   HENT:  Negative for drooling, mouth sores and trouble swallowing.    Eyes:  Negative for redness.   Respiratory:  Negative for choking.    Cardiovascular:  Negative for chest pain.   Gastrointestinal:  Negative for abdominal pain, anal bleeding, blood in stool, constipation, diarrhea, nausea and vomiting.        See HPI   Endocrine: Negative for heat intolerance.   Genitourinary:  Negative for decreased urine volume.   Skin:  Negative for color change and rash.   Allergic/Immunologic: Negative for immunocompromised state.   Neurological:  Negative for seizures.   Psychiatric/Behavioral:  Negative for behavioral problems.        Objective:      Physical Exam    Assessment:       1. Crohn's disease of both small and large intestine without complication    2. Immunosuppression    3. Underweight in childhood with body mass index (BMI) less than fifth percentile          Plan:         CHIEF COMPLAINT: Patient is here for follow up of Crohn's disease in poor weight gain..    HISTORY OF PRESENT ILLNESS:  Patient follows up today for ongoing care of her Crohn's disease.  Patient is on Remicade every 4 weeks at 10 per kilos.  She had an illness recently.  No trouble with infusions.  No menstrual cycles yet.  Family still deciding about improved care now.  Father's a family physician.  He is with [facility].  No menstrual cycles yet.  Regular bowel movements twice a day without blood.  She did receive her HPV vaccination.  Last colonoscopy was visually normal.  There was only minimal mild architectural distortion seen.  Appetite seems to be good.  Energy level is good.  Mom feels like she is doing well clinically.  Mom states that she is checking with dad on signing the improved care now  "consent.    STUDIES REVIEWED:   Last calprotectin 65  Normal CBC CMP sed rate CRP amylase lipase GGT  Records Reviewed: I have personally reviewed the KUB from 1/17/17 an impressive stool burden, 2015 TTG nml, TFT nml, CMP nml  4/2018 EGD/Colon mild ileitis, mild colitis; mild to mod colitis in cecum  4/2018 CT - nml  4/2018 calpro normal likely diluted  3/2018 calpr elevated  6/2018 DXA z score -2.2  9/2018 calpr 1000  4/2018 calpro elevated but also had c diff  9/2019 EGD/Colon - mild to mod ileitis colitis   1/2021 egd/colon - mild pancolitis, normal TI -> remicade started  7/20201 calpro 1000, esr 51, no improvement with growth chart  10/2021 mtx 7.5mg weekly started after mild bump in remicade ab to low level  12/2021 egd/colon with mild colitis. No TI involved  4/2022 esr normal, remicade level 19, no ab  5/2022 calpro 100s  September 2022-calprotectin 270  Last Remicade level October 2022 18 with no antibodies   12/30/22-EGD colonoscopy normal grossly.  Very mild focal findings microscopically.  No significant chronic changes.    MEDICATIONS/ALLERGIES: The patient's MedCard has been reviewed and/or reconciled.    PMH, SH, FH, all reviewed and no changes except as noted.    PHYSICAL EXAMINATION:   BP 96/63 (BP Location: Right arm, Patient Position: Sitting, BP Method: Small (Automatic))   Pulse 87   Temp 97.5 °F (36.4 °C) (Temporal)   Ht 4' 7.32" (1.405 m)   Wt 26.9 kg (59 lb 4.9 oz)   SpO2 98%   BMI 13.63 kg/m²  increasing from recent drop but still below the 3rd percentile  Remainder of vital signs unremarkable, please refer to vital signs sheet.  General: Alert, WN, WH, NAD  Chest: Clear to auscultation bilaterally.No increased work of breathing   Heart: Regular, rate and rhythm without murmur  Abdomen: Soft, non tender, non distended, no hepatosplenomegaly, no stool masses, no rebound or guarding.  Extremities: Symmetric, well perfused and no edema.      IMPRESSION/PLAN:  Patient follows up today for " ongoing care above symptoms and.  Clinically she is doing very well.  She continues to be on the small side.  Her disease microscopically seems to be under good control.  There maybe some mild architectural changes.  Her calprotectin was 65 last which is much decreased from previous.  She seems to be responding well.  She still had trouble gaining weight.  May need to consider other evaluations including Endocrinology.  I discussed improved care now again with mom.  She says that she had talked to father and was waiting for him to consent to signing.  I do think it would be reasonable do follow-up EGD colonoscopy at the 1 year elvia which will be in December.  I will get stool for calprotectin now.  Patient continue on Remicade and methotrexate.  Patient needs health maintenance items listed below including yearly flu shots TB testing eye exams and Dermatology exams.  Do recommend COVID booster.  She should definitely finish her HPV series as already started.  I will see her back in 6 months.  I discussed the risk benefits and alternatives of the procedure including sedation by anesthesia and risk of perforating or bruising the organs of the GI tract with the caretaker who verbalized understanding of the plan and risk associated and agreed to proceed. Consent will be obtained at time of endoscopy.  Patient Instructions   Stool for Calprotectin  EGD/Colonoscopy in December  Continue remicade every 4 weeks  Preventative care reviewed with patient and family including need for annual flu shot as well as all age appropriate non-live vaccines.  Yearly tb testing  Yearly eye exams  Monitor weight  Continue apriso  Continue trexall and folate  Covid booster  Finish HPV  Dermatology yearly  Discuss ICN with dad  Follow up 6 months      Total Time Spent on encounter including chart review, data gathering, face to face time, discussion of findings/plan with patient/family  and chart completion= 40 minutes    This was discussed  at length with parents who expressed understanding and agreement. Questions were answered.  This note has been dictated using voice recognition software.  Note sent to referring physician via [employer] or fax               Self

## 2023-07-27 ENCOUNTER — OUTPATIENT (OUTPATIENT)
Dept: OUTPATIENT SERVICES | Facility: HOSPITAL | Age: 53
LOS: 1 days | End: 2023-07-27
Payer: COMMERCIAL

## 2023-07-27 ENCOUNTER — APPOINTMENT (OUTPATIENT)
Dept: INTERNAL MEDICINE | Facility: CLINIC | Age: 53
End: 2023-07-27
Payer: COMMERCIAL

## 2023-07-27 VITALS
HEIGHT: 67 IN | DIASTOLIC BLOOD PRESSURE: 63 MMHG | WEIGHT: 180 LBS | TEMPERATURE: 96.3 F | HEART RATE: 68 BPM | SYSTOLIC BLOOD PRESSURE: 99 MMHG | OXYGEN SATURATION: 98 % | BODY MASS INDEX: 28.25 KG/M2

## 2023-07-27 DIAGNOSIS — Z00.00 ENCOUNTER FOR GENERAL ADULT MEDICAL EXAMINATION WITHOUT ABNORMAL FINDINGS: ICD-10-CM

## 2023-07-27 DIAGNOSIS — E03.9 HYPOTHYROIDISM, UNSPECIFIED: ICD-10-CM

## 2023-07-27 DIAGNOSIS — R10.9 UNSPECIFIED ABDOMINAL PAIN: ICD-10-CM

## 2023-07-27 DIAGNOSIS — Z00.00 ENCOUNTER FOR GENERAL ADULT MEDICAL EXAMINATION W/OUT ABNORMAL FINDINGS: ICD-10-CM

## 2023-07-27 PROCEDURE — 99214 OFFICE O/P EST MOD 30 MIN: CPT

## 2023-07-27 RX ORDER — LEVOTHYROXINE SODIUM 0.03 MG/1
25 TABLET ORAL DAILY
Qty: 30 | Refills: 3 | Status: ACTIVE | COMMUNITY
Start: 2022-03-11 | End: 1900-01-01

## 2023-07-27 RX ORDER — ATORVASTATIN CALCIUM 40 MG/1
40 TABLET, FILM COATED ORAL
Qty: 30 | Refills: 3 | Status: ACTIVE | COMMUNITY
Start: 2022-03-11 | End: 1900-01-01

## 2023-07-27 NOTE — PHYSICAL EXAM
[No Acute Distress] : no acute distress [No Respiratory Distress] : no respiratory distress  [No Accessory Muscle Use] : no accessory muscle use [Normal Rate] : normal rate  [Normal S1, S2] : normal S1 and S2 [No Edema] : there was no peripheral edema [Soft] : abdomen soft [Non Tender] : non-tender [Non-distended] : non-distended [No CVA Tenderness] : no CVA  tenderness [Alert and Oriented x3] : oriented to person, place, and time

## 2023-07-27 NOTE — HISTORY OF PRESENT ILLNESS
[FreeTextEntry1] : Follow up  [de-identified] : Case of 53-year-old man with PMHx of DLD, hypothyroidism for follow up.\par \par He is doing well, has been complaining of chronic right upper quadrant abdominal pain, colicky, radiates to right and left shoulders sometimes. Not related to food intake \par No melena, no vomiting, no weight loss, No dysuria, no other symptoms.\par \par \par

## 2023-07-27 NOTE — ASSESSMENT
[FreeTextEntry1] : Case of 53-year-old man with PMHx of DLD, hypothyroidism for follow up.\par \par #Hypothyroidism \par #Weight gain:\par - TSH 8.02 in 2022\par - Was supposed to be on Synthroid 25mg PO QD, patient never took it, will re-send\par - F/U repeat TSH, T4, T3\par \par #RUQ pain\par - Looks MSK in nature\par - Will do RUQ US for a start\par \par #Hyperlipemia \par - Likely related to hypothyroidism\par - Was supposed to be on Atorvastatin 40mg PO QD, patient never took it\par - Exercise and low fat diet\par - Repeat lipid panel\par \par # HCM:\par - refer to GI for colonoscopy\par rto in 3 months and prn

## 2023-07-28 DIAGNOSIS — E03.9 HYPOTHYROIDISM, UNSPECIFIED: ICD-10-CM

## 2023-07-28 DIAGNOSIS — Z00.00 ENCOUNTER FOR GENERAL ADULT MEDICAL EXAMINATION WITHOUT ABNORMAL FINDINGS: ICD-10-CM

## 2023-07-28 DIAGNOSIS — R10.9 UNSPECIFIED ABDOMINAL PAIN: ICD-10-CM

## 2023-08-01 ENCOUNTER — OUTPATIENT (OUTPATIENT)
Dept: OUTPATIENT SERVICES | Facility: HOSPITAL | Age: 53
LOS: 1 days | End: 2023-08-01
Payer: COMMERCIAL

## 2023-08-01 DIAGNOSIS — E03.9 HYPOTHYROIDISM, UNSPECIFIED: ICD-10-CM

## 2023-08-01 DIAGNOSIS — Z00.00 ENCOUNTER FOR GENERAL ADULT MEDICAL EXAMINATION WITHOUT ABNORMAL FINDINGS: ICD-10-CM

## 2023-08-01 LAB
25(OH)D3 SERPL-MCNC: 22 NG/ML
ALBUMIN SERPL ELPH-MCNC: 4.5 G/DL
ALP BLD-CCNC: 91 U/L
ALT SERPL-CCNC: 37 U/L
ANION GAP SERPL CALC-SCNC: 13 MMOL/L
AST SERPL-CCNC: 28 U/L
BILIRUB SERPL-MCNC: 0.8 MG/DL
BUN SERPL-MCNC: 14 MG/DL
CALCIUM SERPL-MCNC: 9 MG/DL
CHLORIDE SERPL-SCNC: 101 MMOL/L
CHOLEST SERPL-MCNC: 221 MG/DL
CO2 SERPL-SCNC: 24 MMOL/L
CREAT SERPL-MCNC: 0.6 MG/DL
EGFR: 117 ML/MIN/1.73M2
ESTIMATED AVERAGE GLUCOSE: 111 MG/DL
GLUCOSE SERPL-MCNC: 100 MG/DL
HBA1C MFR BLD HPLC: 5.5 %
HDLC SERPL-MCNC: 49 MG/DL
LDLC SERPL CALC-MCNC: 142 MG/DL
NONHDLC SERPL-MCNC: 172 MG/DL
POTASSIUM SERPL-SCNC: 4.2 MMOL/L
PROT SERPL-MCNC: 6.9 G/DL
SODIUM SERPL-SCNC: 138 MMOL/L
T3 SERPL-MCNC: 128 NG/DL
T4 FREE SERPL-MCNC: 1.2 NG/DL
T4 SERPL-MCNC: 6.5 UG/DL
TRIGL SERPL-MCNC: 150 MG/DL
TSH SERPL-ACNC: 10.21 UIU/ML

## 2023-08-01 PROCEDURE — 85027 COMPLETE CBC AUTOMATED: CPT

## 2023-08-01 PROCEDURE — 84439 ASSAY OF FREE THYROXINE: CPT

## 2023-08-01 PROCEDURE — 84443 ASSAY THYROID STIM HORMONE: CPT

## 2023-08-01 PROCEDURE — 84436 ASSAY OF TOTAL THYROXINE: CPT

## 2023-08-01 PROCEDURE — 82306 VITAMIN D 25 HYDROXY: CPT

## 2023-08-01 PROCEDURE — 80061 LIPID PANEL: CPT

## 2023-08-01 PROCEDURE — 84480 ASSAY TRIIODOTHYRONINE (T3): CPT

## 2023-08-01 PROCEDURE — 83036 HEMOGLOBIN GLYCOSYLATED A1C: CPT

## 2023-08-01 PROCEDURE — 80053 COMPREHEN METABOLIC PANEL: CPT

## 2023-08-02 DIAGNOSIS — Z00.00 ENCOUNTER FOR GENERAL ADULT MEDICAL EXAMINATION WITHOUT ABNORMAL FINDINGS: ICD-10-CM

## 2023-08-02 DIAGNOSIS — E03.9 HYPOTHYROIDISM, UNSPECIFIED: ICD-10-CM

## 2023-11-07 ENCOUNTER — APPOINTMENT (OUTPATIENT)
Dept: OPHTHALMOLOGY | Facility: CLINIC | Age: 53
End: 2023-11-07
Payer: COMMERCIAL

## 2023-11-07 ENCOUNTER — OUTPATIENT (OUTPATIENT)
Dept: OUTPATIENT SERVICES | Facility: HOSPITAL | Age: 53
LOS: 1 days | End: 2023-11-07
Payer: COMMERCIAL

## 2023-11-07 DIAGNOSIS — H53.8 OTHER VISUAL DISTURBANCES: ICD-10-CM

## 2023-11-07 PROCEDURE — 92014 COMPRE OPH EXAM EST PT 1/>: CPT

## 2023-11-14 DIAGNOSIS — H25.13 AGE-RELATED NUCLEAR CATARACT, BILATERAL: ICD-10-CM

## 2023-11-14 DIAGNOSIS — H35.363 DRUSEN (DEGENERATIVE) OF MACULA, BILATERAL: ICD-10-CM

## 2023-11-14 DIAGNOSIS — H11.053 PERIPHERAL PTERYGIUM, PROGRESSIVE, BILATERAL: ICD-10-CM

## 2023-11-29 ENCOUNTER — OUTPATIENT (OUTPATIENT)
Dept: OUTPATIENT SERVICES | Facility: HOSPITAL | Age: 53
LOS: 1 days | End: 2023-11-29
Payer: COMMERCIAL

## 2023-11-29 ENCOUNTER — APPOINTMENT (OUTPATIENT)
Age: 53
End: 2023-11-29
Payer: COMMERCIAL

## 2023-11-29 VITALS
DIASTOLIC BLOOD PRESSURE: 64 MMHG | OXYGEN SATURATION: 96 % | TEMPERATURE: 97 F | BODY MASS INDEX: 28.72 KG/M2 | HEIGHT: 67 IN | WEIGHT: 183 LBS | SYSTOLIC BLOOD PRESSURE: 99 MMHG | HEART RATE: 64 BPM

## 2023-11-29 DIAGNOSIS — R10.84 GENERALIZED ABDOMINAL PAIN: ICD-10-CM

## 2023-11-29 DIAGNOSIS — K21.9 GASTRO-ESOPHAGEAL REFLUX DISEASE W/OUT ESOPHAGITIS: ICD-10-CM

## 2023-11-29 DIAGNOSIS — Z00.00 ENCOUNTER FOR GENERAL ADULT MEDICAL EXAMINATION WITHOUT ABNORMAL FINDINGS: ICD-10-CM

## 2023-11-29 DIAGNOSIS — Z12.11 ENCOUNTER FOR SCREENING FOR MALIGNANT NEOPLASM OF COLON: ICD-10-CM

## 2023-11-29 PROCEDURE — 99204 OFFICE O/P NEW MOD 45 MIN: CPT

## 2023-11-29 RX ORDER — PANTOPRAZOLE 40 MG/1
40 TABLET, DELAYED RELEASE ORAL
Qty: 30 | Refills: 1 | Status: ACTIVE | COMMUNITY
Start: 2023-11-29 | End: 1900-01-01

## 2023-11-30 DIAGNOSIS — R10.84 GENERALIZED ABDOMINAL PAIN: ICD-10-CM

## 2023-11-30 DIAGNOSIS — K21.9 GASTRO-ESOPHAGEAL REFLUX DISEASE WITHOUT ESOPHAGITIS: ICD-10-CM

## 2023-11-30 DIAGNOSIS — Z12.11 ENCOUNTER FOR SCREENING FOR MALIGNANT NEOPLASM OF COLON: ICD-10-CM

## 2023-12-07 ENCOUNTER — OUTPATIENT (OUTPATIENT)
Dept: OUTPATIENT SERVICES | Facility: HOSPITAL | Age: 53
LOS: 1 days | End: 2023-12-07
Payer: COMMERCIAL

## 2023-12-07 DIAGNOSIS — K08.109 COMPLETE LOSS OF TEETH, UNSPECIFIED CAUSE, UNSPECIFIED CLASS: ICD-10-CM

## 2023-12-07 PROCEDURE — D2954: CPT

## 2023-12-08 DIAGNOSIS — K08.109 COMPLETE LOSS OF TEETH, UNSPECIFIED CAUSE, UNSPECIFIED CLASS: ICD-10-CM

## 2023-12-10 DIAGNOSIS — K02.9 DENTAL CARIES, UNSPECIFIED: ICD-10-CM

## 2023-12-21 ENCOUNTER — OUTPATIENT (OUTPATIENT)
Dept: OUTPATIENT SERVICES | Facility: HOSPITAL | Age: 53
LOS: 1 days | End: 2023-12-21

## 2023-12-21 DIAGNOSIS — K08.109 COMPLETE LOSS OF TEETH, UNSPECIFIED CAUSE, UNSPECIFIED CLASS: ICD-10-CM

## 2023-12-26 DIAGNOSIS — K02.9 DENTAL CARIES, UNSPECIFIED: ICD-10-CM

## 2024-01-04 ENCOUNTER — OUTPATIENT (OUTPATIENT)
Dept: OUTPATIENT SERVICES | Facility: HOSPITAL | Age: 54
LOS: 1 days | End: 2024-01-04

## 2024-01-04 DIAGNOSIS — K08.109 COMPLETE LOSS OF TEETH, UNSPECIFIED CAUSE, UNSPECIFIED CLASS: ICD-10-CM

## 2024-01-05 DIAGNOSIS — K02.9 DENTAL CARIES, UNSPECIFIED: ICD-10-CM

## 2024-01-18 ENCOUNTER — OUTPATIENT (OUTPATIENT)
Dept: OUTPATIENT SERVICES | Facility: HOSPITAL | Age: 54
LOS: 1 days | End: 2024-01-18

## 2024-01-18 DIAGNOSIS — K08.109 COMPLETE LOSS OF TEETH, UNSPECIFIED CAUSE, UNSPECIFIED CLASS: ICD-10-CM

## 2024-02-04 ENCOUNTER — RESULT REVIEW (OUTPATIENT)
Age: 54
End: 2024-02-04

## 2024-02-04 ENCOUNTER — OUTPATIENT (OUTPATIENT)
Dept: OUTPATIENT SERVICES | Facility: HOSPITAL | Age: 54
LOS: 1 days | End: 2024-02-04
Payer: COMMERCIAL

## 2024-02-04 DIAGNOSIS — K21.9 GASTRO-ESOPHAGEAL REFLUX DISEASE WITHOUT ESOPHAGITIS: ICD-10-CM

## 2024-02-04 PROCEDURE — 76705 ECHO EXAM OF ABDOMEN: CPT

## 2024-02-04 PROCEDURE — 76705 ECHO EXAM OF ABDOMEN: CPT | Mod: 26

## 2024-02-05 DIAGNOSIS — K21.9 GASTRO-ESOPHAGEAL REFLUX DISEASE WITHOUT ESOPHAGITIS: ICD-10-CM

## 2024-03-12 ENCOUNTER — OUTPATIENT (OUTPATIENT)
Dept: OUTPATIENT SERVICES | Facility: HOSPITAL | Age: 54
LOS: 1 days | End: 2024-03-12
Payer: COMMERCIAL

## 2024-03-12 ENCOUNTER — APPOINTMENT (OUTPATIENT)
Dept: OPHTHALMOLOGY | Facility: CLINIC | Age: 54
End: 2024-03-12
Payer: COMMERCIAL

## 2024-03-12 DIAGNOSIS — H53.8 OTHER VISUAL DISTURBANCES: ICD-10-CM

## 2024-03-12 PROCEDURE — 92012 INTRM OPH EXAM EST PATIENT: CPT

## 2024-03-25 DIAGNOSIS — H25.13 AGE-RELATED NUCLEAR CATARACT, BILATERAL: ICD-10-CM

## 2024-03-25 DIAGNOSIS — H11.053 PERIPHERAL PTERYGIUM, PROGRESSIVE, BILATERAL: ICD-10-CM

## 2024-03-25 DIAGNOSIS — H35.363 DRUSEN (DEGENERATIVE) OF MACULA, BILATERAL: ICD-10-CM

## 2024-03-27 NOTE — ED ADULT NURSE NOTE - HAVE YOU HAD A FIRST COVID-19 BOOSTER?
"Chief Complaint   Patient presents with    WOUND CARE       FOOD SECURITY SCREENING QUESTIONS  Hunger Vital Signs:  Within the past 12 months we worried whether our food would run out before we got money to buy more. Never  Within the past 12 months the food we bought just didn't last and we didn't have money to get more. Never  Shereen Clements LPN 3/27/2024 2:31 PM      Initial /88 (BP Location: Right arm, Patient Position: Sitting, Cuff Size: Adult Large)   Pulse (!) 47   Temp 97.6  F (36.4  C) (Tympanic)   Resp 16   Ht 1.727 m (5' 8\")   Wt 98.7 kg (217 lb 9.6 oz)   SpO2 97%   BMI 33.09 kg/m   Estimated body mass index is 33.09 kg/m  as calculated from the following:    Height as of this encounter: 1.727 m (5' 8\").    Weight as of this encounter: 98.7 kg (217 lb 9.6 oz).  Medication Reconciliation: complete    Shereen Clements LPN    "
No

## 2024-04-02 ENCOUNTER — OUTPATIENT (OUTPATIENT)
Dept: OUTPATIENT SERVICES | Facility: HOSPITAL | Age: 54
LOS: 1 days | End: 2024-04-02

## 2024-04-02 DIAGNOSIS — K08.109 COMPLETE LOSS OF TEETH, UNSPECIFIED CAUSE, UNSPECIFIED CLASS: ICD-10-CM

## 2024-04-03 DIAGNOSIS — K02.9 DENTAL CARIES, UNSPECIFIED: ICD-10-CM

## 2024-04-08 NOTE — ED ADULT NURSE NOTE - NSFALLRSKPASTHIST_ED_ALL_ED
stage 3b chronic kidney disease (HCC)    Gout    Pericardial effusion    Acute on chronic diastolic heart failure (HCC)    History of CVA (cerebrovascular accident)    Nephrotic syndrome    Edema    Skin tear of forearm without complication, sequela    Benign essential HTN    Acute on chronic anemia    Palliative care encounter    ACP (advance care planning)    Goals of care, counseling/discussion    DNR (do not resuscitate) discussion    Acute decompensated heart failure (HCC)    Gastrointestinal hemorrhage with melena       Home Medications:     @[email or web address]    Review of Systems:     Constitutional: No fevers, chills, weight loss, fatigue.   Skin: No rashes, pruritis, jaundice, ulcerations, erythema.   HENT: No headaches, nosebleeds, sinus pressure, rhinorrhea, sore throat.   Eyes: No visual changes, blurred vision, eye pain, photophobia, jaundice.   Cardiovascular: No chest pain, heart palpitations.   Respiratory: No cough, SOB, wheezing, chest discomfort, orthopnea.   Gastrointestinal: Neg unless noted otherwise in H&P   Genitourinary: No dysuria, bleeding, discharge, pyuria.   Musculoskeletal: No weakness, arthralgias, wasting.   Endo: No sweats.   Heme: No bruising, easy bleeding.   Allergies: As noted.   Neurological: Cranial nerves intact.  Alert and oriented. Gait not assessed.   Psychiatric:  No anxiety, depression, hallucinations.          BP (!) 185/170   Pulse 64   Temp 98.2 °F (36.8 °C) (Oral)   Resp 18   Ht 1.753 m (5' 9\")   Wt 104.3 kg (230 lb)   SpO2 98%   BMI 33.97 kg/m²     Physical Assessment:     constitutional: appearance: well developed, well nourished, normal habitus, no deformities, in no acute distress.   skin: inspection: no rashes, ulcers, icterus or other lesions; no clubbing or telangiectasias. palpation: no induration or subcutaneos nodules.   eyes: inspection: normal conjunctivae and lids; no jaundice pupils: normal  ENMT: mouth: normal oral mucosa,lips and gums; good  dentition.   respiratory: effort: normal chest excursion; no intercostal retraction or accessory muscle use.   cardiovascular: normal rhythm; no thrill or murmurs.   abdominal: abdomen: normal consistency; no tenderness or masses. hernias: no hernias appreciated. liver: normal size and consistency. spleen: not palpable.   rectal: hemoccult/guaiac: not performed.   musculoskeletal: normal range of motion; no pain, deformity or contracture.   neurologic: cranial nerves: II-XII normal. Pupils intact.   psychiatric: judgement/insight: within normal limits. memory: within normal limits for recent and remote events. mood and affect: no evidence of depression, anxiety or agitation. orientation: oriented to time, space and person.        Basic Metabolic Profile   Recent Labs     04/08/24  0900      K 2.6*      CO2 30   BUN 66*   MG 2.3         CBC w/Diff    Recent Labs     04/08/24  0900   WBC 12.4   RBC 2.26*   HGB 5.9*   HCT 19.6*   MCV 86.7   MCH 26.1   MCHC 30.1*   RDW 17.8*      MPV 10.3    No results for input(s): \"MONO\", \"BASO\", \"PRO\", \"METAS\", \"BLAST\" in the last 72 hours.    Invalid input(s): \"GRANS\", \"LYMPH\", \"EOS\", \"MYELO\"     Hepatic Function   No results for input(s): \"ALB\", \"TP\", \"AML\" in the last 72 hours.    Invalid input(s): \"DBILI\", \"TBILI\", \"GPT\", \"SGOT\", \"AP\", \"LPSE\"     Coags   No results for input(s): \"INR\", \"APTT\" in the last 72 hours.    Invalid input(s): \"PTP\"          MICHELLE Myles - LUCITA    [facility].  [email or web address]  Phone: [phone]  Pager: [phone]     no

## 2024-04-26 RX ORDER — PEG3350/SOD SULF,BICARB,CL/KCL 240-22.72G
1 SOLUTION, RECONSTITUTED, ORAL ORAL
Qty: 1 | Refills: 0
Start: 2024-04-26 | End: 2024-04-26

## 2024-04-26 RX ORDER — BISACODYL 5 MG
4 TABLET, DELAYED RELEASE (ENTERIC COATED) ORAL
Qty: 4 | Refills: 0
Start: 2024-04-26 | End: 2024-04-26

## 2024-05-01 ENCOUNTER — APPOINTMENT (OUTPATIENT)
Dept: GASTROENTEROLOGY | Facility: CLINIC | Age: 54
End: 2024-05-01

## 2024-05-21 ENCOUNTER — OUTPATIENT (OUTPATIENT)
Dept: OUTPATIENT SERVICES | Facility: HOSPITAL | Age: 54
LOS: 1 days | End: 2024-05-21
Payer: COMMERCIAL

## 2024-05-21 DIAGNOSIS — K02.9 DENTAL CARIES, UNSPECIFIED: ICD-10-CM

## 2024-05-21 PROCEDURE — D0140: CPT

## 2024-05-23 ENCOUNTER — OUTPATIENT (OUTPATIENT)
Dept: OUTPATIENT SERVICES | Facility: HOSPITAL | Age: 54
LOS: 1 days | End: 2024-05-23

## 2024-05-23 DIAGNOSIS — K08.109 COMPLETE LOSS OF TEETH, UNSPECIFIED CAUSE, UNSPECIFIED CLASS: ICD-10-CM

## 2024-05-24 DIAGNOSIS — K02.9 DENTAL CARIES, UNSPECIFIED: ICD-10-CM

## 2024-06-03 ENCOUNTER — OUTPATIENT (OUTPATIENT)
Dept: OUTPATIENT SERVICES | Facility: HOSPITAL | Age: 54
LOS: 1 days | End: 2024-06-03

## 2024-06-03 DIAGNOSIS — K08.109 COMPLETE LOSS OF TEETH, UNSPECIFIED CAUSE, UNSPECIFIED CLASS: ICD-10-CM

## 2024-06-05 DIAGNOSIS — K02.9 DENTAL CARIES, UNSPECIFIED: ICD-10-CM

## 2024-06-20 ENCOUNTER — OUTPATIENT (OUTPATIENT)
Dept: OUTPATIENT SERVICES | Facility: HOSPITAL | Age: 54
LOS: 1 days | End: 2024-06-20
Payer: COMMERCIAL

## 2024-06-20 ENCOUNTER — OUTPATIENT (OUTPATIENT)
Dept: OUTPATIENT SERVICES | Facility: HOSPITAL | Age: 54
LOS: 1 days | End: 2024-06-20

## 2024-06-20 DIAGNOSIS — K08.109 COMPLETE LOSS OF TEETH, UNSPECIFIED CAUSE, UNSPECIFIED CLASS: ICD-10-CM

## 2024-06-20 PROCEDURE — D0170: CPT

## 2024-06-21 DIAGNOSIS — K08.109 COMPLETE LOSS OF TEETH, UNSPECIFIED CAUSE, UNSPECIFIED CLASS: ICD-10-CM

## 2024-07-09 ENCOUNTER — OUTPATIENT (OUTPATIENT)
Dept: OUTPATIENT SERVICES | Facility: HOSPITAL | Age: 54
LOS: 1 days | End: 2024-07-09

## 2024-07-09 DIAGNOSIS — H53.8 OTHER VISUAL DISTURBANCES: ICD-10-CM

## 2024-07-10 ENCOUNTER — RESULT REVIEW (OUTPATIENT)
Age: 54
End: 2024-07-10

## 2024-07-10 ENCOUNTER — TRANSCRIPTION ENCOUNTER (OUTPATIENT)
Age: 54
End: 2024-07-10

## 2024-07-10 ENCOUNTER — OUTPATIENT (OUTPATIENT)
Dept: OUTPATIENT SERVICES | Facility: HOSPITAL | Age: 54
LOS: 1 days | Discharge: HOME | End: 2024-07-10
Payer: COMMERCIAL

## 2024-07-10 VITALS — WEIGHT: 160.06 LBS

## 2024-07-10 VITALS
DIASTOLIC BLOOD PRESSURE: 72 MMHG | SYSTOLIC BLOOD PRESSURE: 112 MMHG | RESPIRATION RATE: 18 BRPM | HEART RATE: 54 BPM | OXYGEN SATURATION: 99 %

## 2024-07-10 DIAGNOSIS — Z12.11 ENCOUNTER FOR SCREENING FOR MALIGNANT NEOPLASM OF COLON: ICD-10-CM

## 2024-07-10 DIAGNOSIS — K21.9 GASTRO-ESOPHAGEAL REFLUX DISEASE WITHOUT ESOPHAGITIS: ICD-10-CM

## 2024-07-10 PROCEDURE — 45378 DIAGNOSTIC COLONOSCOPY: CPT

## 2024-07-10 PROCEDURE — 88305 TISSUE EXAM BY PATHOLOGIST: CPT | Mod: 26

## 2024-07-10 PROCEDURE — 43239 EGD BIOPSY SINGLE/MULTIPLE: CPT

## 2024-07-10 PROCEDURE — 88312 SPECIAL STAINS GROUP 1: CPT

## 2024-07-10 PROCEDURE — 88305 TISSUE EXAM BY PATHOLOGIST: CPT

## 2024-07-10 PROCEDURE — 88312 SPECIAL STAINS GROUP 1: CPT | Mod: 26

## 2024-07-10 RX ORDER — PANTOPRAZOLE SODIUM 40 MG/10ML
1 INJECTION, POWDER, FOR SOLUTION INTRAVENOUS
Qty: 60 | Refills: 3
Start: 2024-07-10 | End: 2024-11-06

## 2024-07-12 LAB
SURGICAL PATHOLOGY STUDY: SIGNIFICANT CHANGE UP
SURGICAL PATHOLOGY STUDY: SIGNIFICANT CHANGE UP

## 2024-07-13 ENCOUNTER — EMERGENCY (EMERGENCY)
Facility: HOSPITAL | Age: 54
LOS: 0 days | Discharge: HOME | End: 2024-07-14
Attending: EMERGENCY MEDICINE
Payer: MEDICAID

## 2024-07-13 VITALS
HEIGHT: 60 IN | TEMPERATURE: 99 F | WEIGHT: 169.98 LBS | OXYGEN SATURATION: 98 % | DIASTOLIC BLOOD PRESSURE: 73 MMHG | HEART RATE: 65 BPM | RESPIRATION RATE: 18 BRPM | SYSTOLIC BLOOD PRESSURE: 123 MMHG

## 2024-07-13 DIAGNOSIS — M79.651 PAIN IN RIGHT THIGH: ICD-10-CM

## 2024-07-13 DIAGNOSIS — M25.551 PAIN IN RIGHT HIP: ICD-10-CM

## 2024-07-13 PROCEDURE — 99285 EMERGENCY DEPT VISIT HI MDM: CPT

## 2024-07-13 PROCEDURE — 99284 EMERGENCY DEPT VISIT MOD MDM: CPT | Mod: 25

## 2024-07-13 PROCEDURE — 96372 THER/PROPH/DIAG INJ SC/IM: CPT

## 2024-07-13 PROCEDURE — 93970 EXTREMITY STUDY: CPT | Mod: 26

## 2024-07-13 PROCEDURE — 73502 X-RAY EXAM HIP UNI 2-3 VIEWS: CPT | Mod: RT

## 2024-07-13 PROCEDURE — 73552 X-RAY EXAM OF FEMUR 2/>: CPT | Mod: RT

## 2024-07-13 PROCEDURE — 93970 EXTREMITY STUDY: CPT

## 2024-07-13 RX ORDER — ACETAMINOPHEN 500 MG/5ML
650 LIQUID (ML) ORAL ONCE
Refills: 0 | Status: COMPLETED | OUTPATIENT
Start: 2024-07-13 | End: 2024-07-13

## 2024-07-13 RX ORDER — KETOROLAC TROMETHAMINE 30 MG/ML
30 INJECTION, SOLUTION INTRAMUSCULAR; INTRAVENOUS ONCE
Refills: 0 | Status: DISCONTINUED | OUTPATIENT
Start: 2024-07-13 | End: 2024-07-13

## 2024-07-13 RX ADMIN — KETOROLAC TROMETHAMINE 30 MILLIGRAM(S): 30 INJECTION, SOLUTION INTRAMUSCULAR; INTRAVENOUS at 22:43

## 2024-07-13 RX ADMIN — Medication 650 MILLIGRAM(S): at 22:44

## 2024-07-14 PROCEDURE — 73552 X-RAY EXAM OF FEMUR 2/>: CPT | Mod: 26,RT

## 2024-07-14 PROCEDURE — 73502 X-RAY EXAM HIP UNI 2-3 VIEWS: CPT | Mod: 26,RT

## 2024-07-14 RX ORDER — IBUPROFEN 200 MG
1 TABLET ORAL
Qty: 9 | Refills: 0
Start: 2024-07-14 | End: 2025-03-14

## 2024-07-14 RX ORDER — IBUPROFEN 200 MG
1 TABLET ORAL
Qty: 15 | Refills: 0
Start: 2024-07-14 | End: 2024-07-18

## 2024-07-14 RX ADMIN — KETOROLAC TROMETHAMINE 30 MILLIGRAM(S): 30 INJECTION, SOLUTION INTRAMUSCULAR; INTRAVENOUS at 01:34

## 2024-07-14 RX ADMIN — Medication 650 MILLIGRAM(S): at 01:34

## 2024-07-17 DIAGNOSIS — Z12.11 ENCOUNTER FOR SCREENING FOR MALIGNANT NEOPLASM OF COLON: ICD-10-CM

## 2024-07-17 DIAGNOSIS — E78.5 HYPERLIPIDEMIA, UNSPECIFIED: ICD-10-CM

## 2024-07-17 DIAGNOSIS — K29.50 UNSPECIFIED CHRONIC GASTRITIS WITHOUT BLEEDING: ICD-10-CM

## 2024-07-17 DIAGNOSIS — K64.4 RESIDUAL HEMORRHOIDAL SKIN TAGS: ICD-10-CM

## 2024-07-17 DIAGNOSIS — K63.5 POLYP OF COLON: ICD-10-CM

## 2024-07-17 DIAGNOSIS — D12.3 BENIGN NEOPLASM OF TRANSVERSE COLON: ICD-10-CM

## 2024-07-17 DIAGNOSIS — K21.9 GASTRO-ESOPHAGEAL REFLUX DISEASE WITHOUT ESOPHAGITIS: ICD-10-CM

## 2024-07-17 DIAGNOSIS — E03.9 HYPOTHYROIDISM, UNSPECIFIED: ICD-10-CM

## 2024-08-20 ENCOUNTER — APPOINTMENT (OUTPATIENT)
Dept: OPHTHALMOLOGY | Facility: CLINIC | Age: 54
End: 2024-08-20
Payer: COMMERCIAL

## 2024-08-20 ENCOUNTER — OUTPATIENT (OUTPATIENT)
Dept: OUTPATIENT SERVICES | Facility: HOSPITAL | Age: 54
LOS: 1 days | End: 2024-08-20
Payer: COMMERCIAL

## 2024-08-20 DIAGNOSIS — H53.8 OTHER VISUAL DISTURBANCES: ICD-10-CM

## 2024-08-20 PROCEDURE — 92012 INTRM OPH EXAM EST PATIENT: CPT

## 2024-08-22 ENCOUNTER — OUTPATIENT (OUTPATIENT)
Dept: OUTPATIENT SERVICES | Facility: HOSPITAL | Age: 54
LOS: 1 days | End: 2024-08-22

## 2024-08-22 DIAGNOSIS — K08.109 COMPLETE LOSS OF TEETH, UNSPECIFIED CAUSE, UNSPECIFIED CLASS: ICD-10-CM

## 2024-10-28 ENCOUNTER — OUTPATIENT (OUTPATIENT)
Dept: OUTPATIENT SERVICES | Facility: HOSPITAL | Age: 54
LOS: 1 days | End: 2024-10-28
Payer: COMMERCIAL

## 2024-10-28 DIAGNOSIS — Z01.20 ENCOUNTER FOR DENTAL EXAMINATION AND CLEANING WITHOUT ABNORMAL FINDINGS: ICD-10-CM

## 2024-10-28 PROCEDURE — D0120: CPT

## 2024-10-28 PROCEDURE — D0274: CPT

## 2024-10-28 PROCEDURE — D0230: CPT

## 2024-10-28 PROCEDURE — D1110: CPT

## 2024-10-31 DIAGNOSIS — Z01.21 ENCOUNTER FOR DENTAL EXAMINATION AND CLEANING WITH ABNORMAL FINDINGS: ICD-10-CM

## 2024-11-05 ENCOUNTER — EMERGENCY (EMERGENCY)
Facility: HOSPITAL | Age: 54
LOS: 0 days | Discharge: HOME | End: 2024-11-05
Attending: STUDENT IN AN ORGANIZED HEALTH CARE EDUCATION/TRAINING PROGRAM
Payer: MEDICAID

## 2024-11-05 VITALS
HEART RATE: 68 BPM | RESPIRATION RATE: 18 BRPM | SYSTOLIC BLOOD PRESSURE: 154 MMHG | TEMPERATURE: 98 F | WEIGHT: 176.37 LBS | OXYGEN SATURATION: 98 % | DIASTOLIC BLOOD PRESSURE: 83 MMHG

## 2024-11-05 DIAGNOSIS — M79.662 PAIN IN LEFT LOWER LEG: ICD-10-CM

## 2024-11-05 DIAGNOSIS — M54.50 LOW BACK PAIN, UNSPECIFIED: ICD-10-CM

## 2024-11-05 DIAGNOSIS — R20.2 PARESTHESIA OF SKIN: ICD-10-CM

## 2024-11-05 PROCEDURE — 96372 THER/PROPH/DIAG INJ SC/IM: CPT

## 2024-11-05 PROCEDURE — 99283 EMERGENCY DEPT VISIT LOW MDM: CPT | Mod: 25

## 2024-11-05 PROCEDURE — 99284 EMERGENCY DEPT VISIT MOD MDM: CPT

## 2024-11-05 RX ORDER — KETOROLAC TROMETHAMINE 30 MG/ML
15 INJECTION INTRAMUSCULAR; INTRAVENOUS ONCE
Refills: 0 | Status: DISCONTINUED | OUTPATIENT
Start: 2024-11-05 | End: 2024-11-05

## 2024-11-05 RX ORDER — ACETAMINOPHEN 500 MG
975 TABLET ORAL ONCE
Refills: 0 | Status: COMPLETED | OUTPATIENT
Start: 2024-11-05 | End: 2024-11-05

## 2024-11-05 RX ORDER — DEXAMETHASONE 1.5 MG 1.5 MG/1
10 TABLET ORAL ONCE
Refills: 0 | Status: COMPLETED | OUTPATIENT
Start: 2024-11-05 | End: 2024-11-05

## 2024-11-05 RX ORDER — LIDOCAINE HYDROCHLORIDE 40 MG/ML
1 SOLUTION TOPICAL ONCE
Refills: 0 | Status: COMPLETED | OUTPATIENT
Start: 2024-11-05 | End: 2024-11-05

## 2024-11-05 RX ADMIN — Medication 975 MILLIGRAM(S): at 21:56

## 2024-11-05 RX ADMIN — KETOROLAC TROMETHAMINE 15 MILLIGRAM(S): 30 INJECTION INTRAMUSCULAR; INTRAVENOUS at 21:56

## 2024-11-05 RX ADMIN — LIDOCAINE HYDROCHLORIDE 1 PATCH: 40 SOLUTION TOPICAL at 21:55

## 2024-11-05 RX ADMIN — DEXAMETHASONE 1.5 MG 10 MILLIGRAM(S): 1.5 TABLET ORAL at 21:56

## 2024-11-05 NOTE — ED PROVIDER NOTE - NSFOLLOWUPINSTRUCTIONS_ED_ALL_ED_FT
Our Emergency Department Referral Coordinators will be reaching out to you in the next 24-48 hours from 9:00am to 5:00pm to schedule a follow up appointment. Please expect a phone call from the hospital in that time frame. If you do not receive a call or if you have any questions or concerns, you can reach them at   [phone].    FOLLOW UP WITH PHYSICAL THERAPY   FOLLOW UP WITH YOUR PRIMARY DOCTOR  RETURN TO ED FOR NEW OR WORSENING SYMPTOMS    Back Pain    Back pain is very common in adults. The cause of back pain is rarely dangerous and the pain often gets better over time. The cause of your back pain may not be known and may include strain of muscles or ligaments, degeneration of the spinal disks, or arthritis. Occasionally the pain may radiate down your leg(s). Over-the-counter medicines to reduce pain and inflammation are often the most helpful. Stretching and remaining active frequently helps the healing process.     SEEK IMMEDIATE MEDICAL CARE IF YOU HAVE ANY OF THE FOLLOWING SYMPTOMS: bowel or bladder control problems, unusual weakness or numbness in your arms or legs, nausea or vomiting, abdominal pain, fever, dizziness/lightheadedness.

## 2024-11-05 NOTE — ED PROVIDER NOTE - PHYSICAL EXAMINATION
CONST: Well appearing in NAD  EYES: EOMI, Sclera and conjunctiva clear.   ENT: No nasal discharge  NECK: Non-tender, no meningeal signs  CARD: Normal S1 S2; Normal rate and rhythm  RESP: Equal BS B/L, No wheezes, rhonchi or rales. No distress  GI: Soft, non-tender, non-distended.  MS: Normal ROM in all extremities. No midline spinal tenderness. TTP left low back/ left lumbar paraspinals; TTP left lateral lower leg without overlying skin changes  SKIN: Warm, dry, no acute rashes. Good turgor  NEURO: A&Ox3, No focal deficits. Strength 5/5 with no sensory deficits. Steady gait

## 2024-11-05 NOTE — ED PROVIDER NOTE - CLINICAL SUMMARY MEDICAL DECISION MAKING FREE TEXT BOX
Patient presents with left leg pain that radiates up into the back.  History likely consistent with possible nerve impingement.  May also have some sort of musculoskeletal injury.  Do not think this is a DVT given no swelling no PE risk factors or DVT risk factors.) bony abnormality given patient has no history of trauma and there is no bony tenderness.  Patient will follow-up with outpatient pcp  and physical therapy

## 2024-11-05 NOTE — ED PROVIDER NOTE - TEST CONSIDERED BUT NOT PERFORMED
Considered x-rays however patient has no bony tenderness and no history of trauma Tests Considered But Not Performed

## 2024-11-05 NOTE — ED PROVIDER NOTE - ATTENDING APP SHARED VISIT CONTRIBUTION OF CARE
I saw and evaluated the patient on my own.  Briefly, I have the following impression and plan...  pt with L calf Pain that radiates from his back.  Has been going on for few days.  No trauma.  On exam ambulatory no tenderness to palpation compartment soft nontender.  No bony deformity or swelling.  Pupils palpable.  Normal cap refill.  Normal sensation    Please see MDM for further details.

## 2024-11-05 NOTE — ED PROVIDER NOTE - NSFOLLOWUPCLINICS_GEN_ALL_ED_FT
[facility]  Physical Therapy  Multiple Location  [place]   Phone: [phone]  Fax:   Follow Up Time: 1-3 Days

## 2024-11-05 NOTE — ED PROVIDER NOTE - OBJECTIVE STATEMENT
Patient is a 54-year-old male no sick [facility] coming to ED for left lower leg pain and left low back pain.  Patient reports x 1 month has had left lower leg pain, atraumatic, now radiates up leg through left buttock to left lower back.  Reports being evaluated for similar symptoms on right side, was told to follow-up with physical therapy but has not yet.  No pain meds prior to arrival.  Denies chest pain, shortness of breath, fever, numbness/weakness, paresthesias, urinary symptoms, inability to walk

## 2024-11-05 NOTE — ED PROVIDER NOTE - PATIENT PORTAL LINK FT
You can access the FollowMyHealth Patient Portal offered by [facility] by registering at the following website: [email or web address]. By joining [employer]’s FollowMyHealth portal, you will also be able to view your health information using other applications (apps) compatible with our system.

## 2024-11-06 RX ORDER — NAPROXEN 250 MG/1
1 TABLET ORAL
Qty: 28 | Refills: 0
Start: 2024-11-06 | End: 2024-11-19

## 2024-11-06 RX ORDER — CYCLOBENZAPRINE HYDROCHLORIDE 30 MG/1
1 CAPSULE, EXTENDED RELEASE ORAL
Qty: 21 | Refills: 0
Start: 2024-11-06 | End: 2024-11-12

## 2024-11-06 NOTE — ED POST DISCHARGE NOTE - RESULT SUMMARY
SPOKE WITH PATIENT: NEEDS PT RX FOR BACK PAIN AND LEG PAIN, WITH TINGLING TO LEG AND FEET. PT RX SENT. ALSO, SENT FX FOF NAPROXEN AND FLEXERIL

## 2024-11-18 ENCOUNTER — OUTPATIENT (OUTPATIENT)
Dept: OUTPATIENT SERVICES | Facility: HOSPITAL | Age: 54
LOS: 1 days | End: 2024-11-18

## 2024-11-18 DIAGNOSIS — K08.109 COMPLETE LOSS OF TEETH, UNSPECIFIED CAUSE, UNSPECIFIED CLASS: ICD-10-CM

## 2024-11-20 ENCOUNTER — OUTPATIENT (OUTPATIENT)
Dept: OUTPATIENT SERVICES | Facility: HOSPITAL | Age: 54
LOS: 1 days | End: 2024-11-20
Payer: COMMERCIAL

## 2024-11-20 ENCOUNTER — APPOINTMENT (OUTPATIENT)
Dept: OPHTHALMOLOGY | Facility: CLINIC | Age: 54
End: 2024-11-20

## 2024-11-20 DIAGNOSIS — H53.8 OTHER VISUAL DISTURBANCES: ICD-10-CM

## 2024-11-20 PROCEDURE — 92012 INTRM OPH EXAM EST PATIENT: CPT

## 2024-12-02 ENCOUNTER — OUTPATIENT (OUTPATIENT)
Dept: OUTPATIENT SERVICES | Facility: HOSPITAL | Age: 54
LOS: 1 days | End: 2024-12-02
Payer: SELF-PAY

## 2024-12-02 DIAGNOSIS — K01.1 IMPACTED TEETH: ICD-10-CM

## 2024-12-02 PROCEDURE — D0170: CPT

## 2024-12-03 DIAGNOSIS — K02.9 DENTAL CARIES, UNSPECIFIED: ICD-10-CM

## 2024-12-16 ENCOUNTER — OUTPATIENT (OUTPATIENT)
Dept: OUTPATIENT SERVICES | Facility: HOSPITAL | Age: 54
LOS: 1 days | End: 2024-12-16

## 2024-12-16 DIAGNOSIS — K08.109 COMPLETE LOSS OF TEETH, UNSPECIFIED CAUSE, UNSPECIFIED CLASS: ICD-10-CM

## 2024-12-26 ENCOUNTER — OUTPATIENT (OUTPATIENT)
Dept: OUTPATIENT SERVICES | Facility: HOSPITAL | Age: 54
LOS: 1 days | End: 2024-12-26
Payer: COMMERCIAL

## 2024-12-26 DIAGNOSIS — K02.52 DENTAL CARIES ON PIT AND FISSURE SURFACE PENETRATING INTO DENTIN: ICD-10-CM

## 2024-12-26 PROCEDURE — T1013: CPT

## 2024-12-26 PROCEDURE — D2331: CPT

## 2024-12-30 DIAGNOSIS — K02.9 DENTAL CARIES, UNSPECIFIED: ICD-10-CM

## 2025-03-12 ENCOUNTER — EMERGENCY (EMERGENCY)
Facility: HOSPITAL | Age: 55
LOS: 0 days | Discharge: HOME | End: 2025-03-12
Attending: EMERGENCY MEDICINE
Payer: MEDICAID

## 2025-03-12 VITALS
RESPIRATION RATE: 18 BRPM | DIASTOLIC BLOOD PRESSURE: 76 MMHG | TEMPERATURE: 98 F | SYSTOLIC BLOOD PRESSURE: 119 MMHG | OXYGEN SATURATION: 99 % | HEART RATE: 63 BPM

## 2025-03-12 VITALS
SYSTOLIC BLOOD PRESSURE: 125 MMHG | RESPIRATION RATE: 18 BRPM | DIASTOLIC BLOOD PRESSURE: 82 MMHG | TEMPERATURE: 98 F | HEART RATE: 67 BPM | OXYGEN SATURATION: 96 %

## 2025-03-12 DIAGNOSIS — S61.233A PUNCTURE WOUND WITHOUT FOREIGN BODY OF LEFT MIDDLE FINGER WITHOUT DAMAGE TO NAIL, INITIAL ENCOUNTER: ICD-10-CM
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
## 2025-03-12 DIAGNOSIS — Z23 ENCOUNTER FOR IMMUNIZATION: ICD-10-CM

## 2025-03-12 PROCEDURE — 90471 IMMUNIZATION ADMIN: CPT

## 2025-03-12 PROCEDURE — 99283 EMERGENCY DEPT VISIT LOW MDM: CPT | Mod: 25

## 2025-03-12 PROCEDURE — 73140 X-RAY EXAM OF FINGER(S): CPT | Mod: LT

## 2025-03-12 PROCEDURE — 99284 EMERGENCY DEPT VISIT MOD MDM: CPT

## 2025-03-12 PROCEDURE — 73140 X-RAY EXAM OF FINGER(S): CPT | Mod: 26,LT

## 2025-03-12 PROCEDURE — 90715 TDAP VACCINE 7 YRS/> IM: CPT

## 2025-03-12 RX ORDER — IBUPROFEN 200 MG
600 TABLET ORAL ONCE
Refills: 0 | Status: COMPLETED | OUTPATIENT
Start: 2025-03-12 | End: 2025-03-12

## 2025-03-12 RX ORDER — AMOXICILLIN AND CLAVULANATE POTASSIUM 500; 125 MG/1; MG/1
875 TABLET, FILM COATED ORAL
Qty: 14 | Refills: 0
Start: 2025-03-12 | End: 2025-03-18

## 2025-03-12 RX ORDER — CLOSTRIDIUM TETANI TOXOID ANTIGEN (FORMALDEHYDE INACTIVATED), CORYNEBACTERIUM DIPHTHERIAE TOXOID ANTIGEN (FORMALDEHYDE INACTIVATED), BORDETELLA PERTUSSIS TOXOID ANTIGEN (GLUTARALDEHYDE INACTIVATED), BORDETELLA PERTUSSIS FILAMENTOUS HEMAGGLUTININ ANTIGEN (FORMALDEHYDE INACTIVATED), BORDETELLA PERTUSSIS PERTACTIN ANTIGEN, AND BORDETELLA PERTUSSIS FIMBRIAE 2/3 ANTIGEN 5; 2; 2.5; 5; 3; 5 [LF]/.5ML; [LF]/.5ML; UG/.5ML; UG/.5ML; UG/.5ML; UG/.5ML
0.5 INJECTION, SUSPENSION INTRAMUSCULAR ONCE
Refills: 0 | Status: COMPLETED | OUTPATIENT
Start: 2025-03-12 | End: 2025-03-12

## 2025-03-12 RX ADMIN — Medication 600 MILLIGRAM(S): at 21:29

## 2025-03-12 RX ADMIN — CLOSTRIDIUM TETANI TOXOID ANTIGEN (FORMALDEHYDE INACTIVATED), CORYNEBACTERIUM DIPHTHERIAE TOXOID ANTIGEN (FORMALDEHYDE INACTIVATED), BORDETELLA PERTUSSIS TOXOID ANTIGEN (GLUTARALDEHYDE INACTIVATED), BORDETELLA PERTUSSIS FILAMENTOUS HEMAGGLUTININ ANTIGEN (FORMALDEHYDE INACTIVATED), BORDETELLA PERTUSSIS PERTACTIN ANTIGEN, AND BORDETELLA PERTUSSIS FIMBRIAE 2/3 ANTIGEN 0.5 MILLILITER(S): 5; 2; 2.5; 5; 3; 5 INJECTION, SUSPENSION INTRAMUSCULAR at 21:30

## 2025-03-12 RX ADMIN — Medication 600 MILLIGRAM(S): at 22:01

## 2025-03-12 NOTE — ED ADULT NURSE NOTE - CHIEF COMPLAINT QUOTE
pt complains of pain to left third finger pain, pt states there is nail in it Island Pedicle Flap With Canthal Suspension Text: The defect edges were debeveled with a #15 scalpel blade.  Given the location of the defect, shape of the defect and the proximity to free margins an island pedicle advancement flap was deemed most appropriate.  Using a sterile surgical marker, an appropriate advancement flap was drawn incorporating the defect, outlining the appropriate donor tissue and placing the expected incisions within the relaxed skin tension lines where possible. The area thus outlined was incised deep to adipose tissue with a #15 scalpel blade.  The skin margins were undermined to an appropriate distance in all directions around the primary defect and laterally outward around the island pedicle utilizing iris scissors.  There was minimal undermining beneath the pedicle flap. A suspension suture was placed in the canthal tendon to prevent tension and prevent ectropion.

## 2025-03-12 NOTE — ED PROVIDER NOTE - CLINICAL SUMMARY MEDICAL DECISION MAKING FREE TEXT BOX
Patient is evaluated for nail injury to finger, mild swelling noted.  X-ray without apparent foreign body or signs of injury. ED XR prelim, my independent interpretation - Dr. Payal Bazan [No fracture, foreign body or dislocation noted]. Patient's advised to continue local wound care, antibiotics prescribed for prophylaxis and patient advised to follow-up closely for reassessment in the next couple days and agrees.  Ibuprofen prescribed as needed.  Tetanus updated in ED.  Strict return precautions advised and patient verbalized understanding.

## 2025-03-12 NOTE — ED PROVIDER NOTE - OBJECTIVE STATEMENT
54-year-old male with no significant past medical history presents to the ED after he accidentally stuck himself with a needle into his left middle finger when he was trying to  a bag of wood outside 2 hours prior to arrival.  Patient saw the needle and took it out himself. Unknown last tetanus.

## 2025-03-12 NOTE — ED ADULT NURSE NOTE - NS ED NURSE LEVEL OF CONSCIOUSNESS SPEECH
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  Current Peld -2    Insurance information:  Primary:  [employer] PPO  Luis # [id number]  Name of insured:  Venancio Tay    Secondary:  [facility]  Policy # [id number]  
Speaking Coherently

## 2025-03-12 NOTE — ED PROVIDER NOTE - ATTENDING CONTRIBUTION TO CARE
54-year-old male presents for evaluation of left third finger pain that occurred earlier in the evening.  Patient states he was cleaning up something on the street and he had a bag of nails which poked him in the finger.  States it was on a piece of wood.  Patient with some swelling and discomfort to the tip of finger.  Unknown last tetanus shot.  Denies any numbness or weakness.  Able to range fully.  No hand swelling, streaking, fevers, chills.    VITAL SIGNS: noted  CONSTITUTIONAL: Well-developed; well-nourished; in no acute distress  HEAD: Normocephalic; atraumatic  EYES: PERRL, EOM intact; conjunctiva and sclera clear  ENT: No nasal discharge; airway clear. MMM  NECK: Supple; non tender.    CARD: S1, S2 normal; no murmurs, gallops, or rubs. Regular rate and rhythm  RESP: CTAB/L, no wheezes, rales or rhonchi  ABD: Normal bowel sounds; soft; non-distended; non-tender   EXT: Normal ROM to hand and finger bilateral. left third digit with mild swelling and tenderness, small puncture wound at thi of finger, no active bleeding.   Distal pulses intact; strength and sensation equal and intact  NEURO: Alert, oriented. Grossly unremarkable. No focal deficits  SKIN: Skin exam is warm and dry

## 2025-03-12 NOTE — ED PROVIDER NOTE - NSFOLLOWUPINSTRUCTIONS_ED_ALL_ED_FT
Puncture Wound- PLEASE TAKE THE MEDICATIONS SENT TO YOUR PHARMACY AS PRESCRIBED    A puncture wound is an injury that is caused by a sharp, thin object that goes through your skin. A puncture wound usually does not leave a large opening in your skin, so it may not bleed a lot. However, when you get a puncture wound, dirt or other materials (foreign bodies) can be forced into your wound and can break off inside. This increases the chance of infection, such as tetanus. There are many sharp, pointed objects that can cause puncture wounds, including teeth, nails, splinters of glass, fishhooks, and needles.    Treatment may include the following steps:  Washing out the wound with a germ-free (sterile) salt-water solution.  Having surgery to open the wound and remove materials from it.  Your wound may be:  Closed with stitches (sutures).  Covered with antibiotic ointment and a bandage (dressing).  Depending on what caused the injury, you may also need a tetanus shot or a rabies shot.    Follow these instructions at home:  Medicines    Take or apply over-the-counter and prescription medicines only as told by your doctor.  If you were prescribed antibiotics, take or apply them as told by your doctor. Do not stop using them even if you start to feel better.  Bathing    Keep the bandage dry as told by your doctor.  Do not take baths, swim, or use a hot tub. Ask your doctor about taking showers or sponge baths.  Wound care    Two puncture sites on the skin. One is normal and the other is red with pus.  Follow instructions from your doctor about how to take care of your wound. Make sure you:  Wash your hands with soap and water for at least 20 seconds before and after you change your bandage. If you cannot use soap and water, use hand .  Change your bandage.  Leave stitches or skin glue in place for at least 2 weeks.  Leave tape strips alone unless you are told to take them off. You may trim the edges of the tape strips if they curl up.  Clean the wound as told by your doctor.  Do not scratch or pick at the wound.  Check your wound every day for signs of infection. Watch for:  Redness, swelling, or pain.  Fluid or blood.  Warmth.  Pus or a bad smell.  General instructions    Raise (elevate) the injured area above the level of your heart while you are sitting or lying down.  If your puncture wound is in your foot, ask your doctor if you need to avoid putting weight on your foot and for how long. Use crutches as told by your doctor.  Keep all follow-up visits.  Contact a doctor if:  You got a tetanus shot and you have any of these problems at the injection site:  Swelling.  Very bad pain.  Redness.  Bleeding.  You have any of these signs of infection in your wound area:  Redness, swelling, or pain.  Fluid or blood.  Warmth.  Pus or a bad smell.  You have a fever.  Your stitches come out.  You notice something coming out of the wound, such as wood or glass.  Medicine does not help your pain.  You start to lose feeling (have numbness) around the wound.  Get help right away if:  You have very bad swelling around the wound.  You have a red streak going away from your wound.  You start to get painful skin lumps.  The wound is on your hand or foot and you:  Cannot move a finger or toe like normal.  Notice that your fingers or toes look pale or blue.  Summary  A puncture wound is an injury that is caused by a sharp, thin object that goes through your skin.  Treatment may include washing out the wound and having surgery to open the wound to clean it.  Follow instructions from your doctor about how to take care of your wound.  Contact your doctor if you have more redness, swelling, or pain at the site of your wound. Puncture Wound- PLEASE TAKE THE MEDICATIONS SENT TO YOUR PHARMACY AS PRESCRIBED    Our Emergency Department Referral Coordinators will be reaching out to you in the next 24-48 hours from 9:00am to 5:00pm to schedule a follow up appointment. Please expect a phone call from the hospital in that time frame. If you do not receive a call or if you have any questions or concerns, you can reach them at   [phone]     A puncture wound is an injury that is caused by a sharp, thin object that goes through your skin. A puncture wound usually does not leave a large opening in your skin, so it may not bleed a lot. However, when you get a puncture wound, dirt or other materials (foreign bodies) can be forced into your wound and can break off inside. This increases the chance of infection, such as tetanus. There are many sharp, pointed objects that can cause puncture wounds, including teeth, nails, splinters of glass, fishhooks, and needles.    Treatment may include the following steps:  Washing out the wound with a germ-free (sterile) salt-water solution.  Having surgery to open the wound and remove materials from it.  Your wound may be:  Closed with stitches (sutures).  Covered with antibiotic ointment and a bandage (dressing).  Depending on what caused the injury, you may also need a tetanus shot or a rabies shot.    Follow these instructions at home:  Medicines    Take or apply over-the-counter and prescription medicines only as told by your doctor.  If you were prescribed antibiotics, take or apply them as told by your doctor. Do not stop using them even if you start to feel better.  Bathing    Keep the bandage dry as told by your doctor.  Do not take baths, swim, or use a hot tub. Ask your doctor about taking showers or sponge baths.  Wound care    Two puncture sites on the skin. One is normal and the other is red with pus.  Follow instructions from your doctor about how to take care of your wound. Make sure you:  Wash your hands with soap and water for at least 20 seconds before and after you change your bandage. If you cannot use soap and water, use hand .  Change your bandage.  Leave stitches or skin glue in place for at least 2 weeks.  Leave tape strips alone unless you are told to take them off. You may trim the edges of the tape strips if they curl up.  Clean the wound as told by your doctor.  Do not scratch or pick at the wound.  Check your wound every day for signs of infection. Watch for:  Redness, swelling, or pain.  Fluid or blood.  Warmth.  Pus or a bad smell.  General instructions    Raise (elevate) the injured area above the level of your heart while you are sitting or lying down.  If your puncture wound is in your foot, ask your doctor if you need to avoid putting weight on your foot and for how long. Use crutches as told by your doctor.  Keep all follow-up visits.  Contact a doctor if:  You got a tetanus shot and you have any of these problems at the injection site:  Swelling.  Very bad pain.  Redness.  Bleeding.  You have any of these signs of infection in your wound area:  Redness, swelling, or pain.  Fluid or blood.  Warmth.  Pus or a bad smell.  You have a fever.  Your stitches come out.  You notice something coming out of the wound, such as wood or glass.  Medicine does not help your pain.  You start to lose feeling (have numbness) around the wound.  Get help right away if:  You have very bad swelling around the wound.  You have a red streak going away from your wound.  You start to get painful skin lumps.  The wound is on your hand or foot and you:  Cannot move a finger or toe like normal.  Notice that your fingers or toes look pale or blue.  Summary  A puncture wound is an injury that is caused by a sharp, thin object that goes through your skin.  Treatment may include washing out the wound and having surgery to open the wound to clean it.  Follow instructions from your doctor about how to take care of your wound.  Contact your doctor if you have more redness, swelling, or pain at the site of your wound.

## 2025-03-12 NOTE — ED ADULT TRIAGE NOTE - AS TEMP SITE
----- Message from Pamela Blackwood sent at 6/21/2017 10:38 AM CDT -----  Contact: [phone]  mom  Would not let me schedule apt, for visit type.  Pt thinks he is a girl states mom. Has hormone trouble states mom. Please call mom to schedule apt. Pt was recommended by psychologist in [place].    oral

## 2025-03-12 NOTE — ED PROVIDER NOTE - PHYSICAL EXAMINATION
CONSTITUTIONAL: NAD  SKIN: Warm dry  HEAD: NCAT  EYES: NL inspection  ENT: MMM  NECK: Supple; non tender.  CARD: RRR  RESP: No respiratory distress  ABD: S/NT no R/G  EXT: no pedal edema. Full ROM of left finger.   NEURO: Grossly unremarkable  PSYCH: Cooperative, appropriate.

## 2025-03-12 NOTE — ED PROVIDER NOTE - PATIENT PORTAL LINK FT
You can access the FollowMyHealth Patient Portal offered by [facility] by registering at the following website: [email or web address]. By joining [employer]’s FollowMyHealth portal, you will also be able to view your health information using other applications (apps) compatible with our system.

## 2025-03-14 NOTE — CHART NOTE - NSCHARTNOTEFT_GEN_A_CORE
"[facility] MRN [id number] / PCP & PLASTIC / Line busy via  3/13 - [facility] / DR ARIZMENDI / Pt wants both PCP & PLASTIC, book next available 3/14 - [facility] / Appointment made - [facility] / [address] / [place] / 	Fri 03/21/2025 02:00 PM"    SPECIALTY: plastic surgery

## 2025-03-21 ENCOUNTER — APPOINTMENT (OUTPATIENT)
Dept: PLASTIC SURGERY | Facility: CLINIC | Age: 55
End: 2025-03-21

## 2025-03-21 DIAGNOSIS — S61.239A PUNCTURE WOUND W/OUT FOREIGN BODY OF UNSPECIFIED FINGER W/OUT DAMAGE TO NAIL, INITIAL ENCOUNTER: ICD-10-CM

## 2025-03-21 PROCEDURE — 99203 OFFICE O/P NEW LOW 30 MIN: CPT

## 2025-03-28 ENCOUNTER — APPOINTMENT (OUTPATIENT)
Dept: INTERNAL MEDICINE | Facility: CLINIC | Age: 55
End: 2025-03-28
Payer: COMMERCIAL

## 2025-03-28 ENCOUNTER — OUTPATIENT (OUTPATIENT)
Dept: OUTPATIENT SERVICES | Facility: HOSPITAL | Age: 55
LOS: 1 days | End: 2025-03-28
Payer: COMMERCIAL

## 2025-03-28 VITALS
HEIGHT: 67 IN | TEMPERATURE: 97.2 F | HEART RATE: 65 BPM | WEIGHT: 187.25 LBS | SYSTOLIC BLOOD PRESSURE: 128 MMHG | DIASTOLIC BLOOD PRESSURE: 78 MMHG | OXYGEN SATURATION: 97 % | BODY MASS INDEX: 29.39 KG/M2

## 2025-03-28 DIAGNOSIS — E03.9 HYPOTHYROIDISM, UNSPECIFIED: ICD-10-CM

## 2025-03-28 DIAGNOSIS — E78.5 HYPERLIPIDEMIA, UNSPECIFIED: ICD-10-CM

## 2025-03-28 DIAGNOSIS — Z00.00 ENCOUNTER FOR GENERAL ADULT MEDICAL EXAMINATION WITHOUT ABNORMAL FINDINGS: ICD-10-CM

## 2025-03-28 DIAGNOSIS — Z00.00 ENCOUNTER FOR GENERAL ADULT MEDICAL EXAMINATION W/OUT ABNORMAL FINDINGS: ICD-10-CM

## 2025-03-28 PROCEDURE — 99213 OFFICE O/P EST LOW 20 MIN: CPT

## 2025-03-31 ENCOUNTER — OUTPATIENT (OUTPATIENT)
Dept: OUTPATIENT SERVICES | Facility: HOSPITAL | Age: 55
LOS: 1 days | End: 2025-03-31
Payer: COMMERCIAL

## 2025-03-31 ENCOUNTER — LABORATORY RESULT (OUTPATIENT)
Age: 55
End: 2025-03-31

## 2025-03-31 DIAGNOSIS — Z00.00 ENCOUNTER FOR GENERAL ADULT MEDICAL EXAMINATION WITHOUT ABNORMAL FINDINGS: ICD-10-CM

## 2025-03-31 LAB
25(OH)D3 SERPL-MCNC: 18 NG/ML
ALBUMIN SERPL ELPH-MCNC: 4.4 G/DL
ALP BLD-CCNC: 99 U/L
ALT SERPL-CCNC: 44 U/L
ANION GAP SERPL CALC-SCNC: 14 MMOL/L
AST SERPL-CCNC: 30 U/L
BILIRUB SERPL-MCNC: 0.9 MG/DL
BUN SERPL-MCNC: 13 MG/DL
CALCIUM SERPL-MCNC: 9 MG/DL
CHLORIDE SERPL-SCNC: 105 MMOL/L
CHOLEST SERPL-MCNC: 244 MG/DL
CO2 SERPL-SCNC: 24 MMOL/L
CREAT SERPL-MCNC: 0.7 MG/DL
EGFRCR SERPLBLD CKD-EPI 2021: 111 ML/MIN/1.73M2
ESTIMATED AVERAGE GLUCOSE: 114 MG/DL
GLUCOSE SERPL-MCNC: 99 MG/DL
HBA1C MFR BLD HPLC: 5.6 %
HDLC SERPL-MCNC: 46 MG/DL
LDLC SERPL-MCNC: 176 MG/DL
NONHDLC SERPL-MCNC: 198 MG/DL
POTASSIUM SERPL-SCNC: 4.4 MMOL/L
PROT SERPL-MCNC: 7 G/DL
SODIUM SERPL-SCNC: 143 MMOL/L
TRIGL SERPL-MCNC: 123 MG/DL
TSH SERPL-ACNC: 9.25 UIU/ML

## 2025-03-31 PROCEDURE — 82306 VITAMIN D 25 HYDROXY: CPT

## 2025-03-31 PROCEDURE — 84443 ASSAY THYROID STIM HORMONE: CPT

## 2025-03-31 PROCEDURE — 83036 HEMOGLOBIN GLYCOSYLATED A1C: CPT

## 2025-03-31 PROCEDURE — 80061 LIPID PANEL: CPT

## 2025-03-31 PROCEDURE — 84439 ASSAY OF FREE THYROXINE: CPT

## 2025-03-31 PROCEDURE — 80053 COMPREHEN METABOLIC PANEL: CPT

## 2025-04-01 DIAGNOSIS — Z00.00 ENCOUNTER FOR GENERAL ADULT MEDICAL EXAMINATION WITHOUT ABNORMAL FINDINGS: ICD-10-CM

## 2025-04-03 DIAGNOSIS — E03.9 HYPOTHYROIDISM, UNSPECIFIED: ICD-10-CM

## 2025-04-03 DIAGNOSIS — Z00.00 ENCOUNTER FOR GENERAL ADULT MEDICAL EXAMINATION WITHOUT ABNORMAL FINDINGS: ICD-10-CM

## 2025-04-03 DIAGNOSIS — E78.5 HYPERLIPIDEMIA, UNSPECIFIED: ICD-10-CM

## 2025-04-07 ENCOUNTER — OUTPATIENT (OUTPATIENT)
Dept: OUTPATIENT SERVICES | Facility: HOSPITAL | Age: 55
LOS: 1 days | End: 2025-04-07
Payer: COMMERCIAL

## 2025-04-07 ENCOUNTER — APPOINTMENT (OUTPATIENT)
Age: 55
End: 2025-04-07
Payer: COMMERCIAL

## 2025-04-07 VITALS
DIASTOLIC BLOOD PRESSURE: 71 MMHG | HEIGHT: 67 IN | WEIGHT: 187 LBS | OXYGEN SATURATION: 96 % | SYSTOLIC BLOOD PRESSURE: 105 MMHG | BODY MASS INDEX: 29.35 KG/M2 | HEART RATE: 80 BPM | TEMPERATURE: 97 F

## 2025-04-07 DIAGNOSIS — N50.89 OTHER SPECIFIED DISORDERS OF THE MALE GENITAL ORGANS: ICD-10-CM

## 2025-04-07 DIAGNOSIS — N52.9 MALE ERECTILE DYSFUNCTION, UNSPECIFIED: ICD-10-CM

## 2025-04-07 DIAGNOSIS — Z00.00 ENCOUNTER FOR GENERAL ADULT MEDICAL EXAMINATION WITHOUT ABNORMAL FINDINGS: ICD-10-CM

## 2025-04-07 DIAGNOSIS — N50.812 LEFT TESTICULAR PAIN: ICD-10-CM

## 2025-04-07 PROCEDURE — 99204 OFFICE O/P NEW MOD 45 MIN: CPT

## 2025-04-07 PROCEDURE — 99214 OFFICE O/P EST MOD 30 MIN: CPT

## 2025-04-07 PROCEDURE — T1013: CPT

## 2025-04-07 RX ORDER — SILDENAFIL 50 MG/1
50 TABLET ORAL
Qty: 10 | Refills: 1 | Status: ACTIVE | COMMUNITY
Start: 2025-04-07 | End: 1900-01-01

## 2025-04-10 ENCOUNTER — RESULT REVIEW (OUTPATIENT)
Age: 55
End: 2025-04-10

## 2025-04-10 ENCOUNTER — OUTPATIENT (OUTPATIENT)
Dept: OUTPATIENT SERVICES | Facility: HOSPITAL | Age: 55
LOS: 1 days | End: 2025-04-10
Payer: COMMERCIAL

## 2025-04-10 DIAGNOSIS — N50.812 LEFT TESTICULAR PAIN: ICD-10-CM

## 2025-04-10 DIAGNOSIS — Z00.8 ENCOUNTER FOR OTHER GENERAL EXAMINATION: ICD-10-CM

## 2025-04-10 PROCEDURE — 76870 US EXAM SCROTUM: CPT

## 2025-04-10 PROCEDURE — 76870 US EXAM SCROTUM: CPT | Mod: 26

## 2025-04-11 DIAGNOSIS — N50.812 LEFT TESTICULAR PAIN: ICD-10-CM

## 2025-04-15 DIAGNOSIS — N50.812 LEFT TESTICULAR PAIN: ICD-10-CM

## 2025-04-15 DIAGNOSIS — N52.9 MALE ERECTILE DYSFUNCTION, UNSPECIFIED: ICD-10-CM

## 2025-04-15 DIAGNOSIS — N50.89 OTHER SPECIFIED DISORDERS OF THE MALE GENITAL ORGANS: ICD-10-CM

## 2025-04-28 ENCOUNTER — APPOINTMENT (OUTPATIENT)
Dept: INTERNAL MEDICINE | Facility: CLINIC | Age: 55
End: 2025-04-28
Payer: COMMERCIAL

## 2025-04-28 ENCOUNTER — OUTPATIENT (OUTPATIENT)
Dept: OUTPATIENT SERVICES | Facility: HOSPITAL | Age: 55
LOS: 1 days | End: 2025-04-28
Payer: COMMERCIAL

## 2025-04-28 VITALS
BODY MASS INDEX: 29.29 KG/M2 | SYSTOLIC BLOOD PRESSURE: 136 MMHG | WEIGHT: 187 LBS | HEART RATE: 78 BPM | TEMPERATURE: 97.9 F | OXYGEN SATURATION: 97 % | DIASTOLIC BLOOD PRESSURE: 77 MMHG

## 2025-04-28 DIAGNOSIS — E03.9 HYPOTHYROIDISM, UNSPECIFIED: ICD-10-CM

## 2025-04-28 DIAGNOSIS — E78.5 HYPERLIPIDEMIA, UNSPECIFIED: ICD-10-CM

## 2025-04-28 DIAGNOSIS — Z00.00 ENCOUNTER FOR GENERAL ADULT MEDICAL EXAMINATION WITHOUT ABNORMAL FINDINGS: ICD-10-CM

## 2025-04-28 DIAGNOSIS — Z00.00 ENCOUNTER FOR GENERAL ADULT MEDICAL EXAMINATION W/OUT ABNORMAL FINDINGS: ICD-10-CM

## 2025-04-28 DIAGNOSIS — R06.00 DYSPNEA, UNSPECIFIED: ICD-10-CM

## 2025-04-28 PROCEDURE — 99213 OFFICE O/P EST LOW 20 MIN: CPT

## 2025-04-29 ENCOUNTER — OUTPATIENT (OUTPATIENT)
Dept: OUTPATIENT SERVICES | Facility: HOSPITAL | Age: 55
LOS: 1 days | End: 2025-04-29
Payer: COMMERCIAL

## 2025-04-29 DIAGNOSIS — Z01.20 ENCOUNTER FOR DENTAL EXAMINATION AND CLEANING WITHOUT ABNORMAL FINDINGS: ICD-10-CM

## 2025-04-29 PROCEDURE — T1013: CPT

## 2025-04-29 PROCEDURE — D0120: CPT

## 2025-04-29 PROCEDURE — D1110: CPT

## 2025-04-30 DIAGNOSIS — Z01.21 ENCOUNTER FOR DENTAL EXAMINATION AND CLEANING WITH ABNORMAL FINDINGS: ICD-10-CM

## 2025-05-01 DIAGNOSIS — E03.9 HYPOTHYROIDISM, UNSPECIFIED: ICD-10-CM

## 2025-05-01 DIAGNOSIS — Z00.00 ENCOUNTER FOR GENERAL ADULT MEDICAL EXAMINATION WITHOUT ABNORMAL FINDINGS: ICD-10-CM

## 2025-05-01 DIAGNOSIS — E78.5 HYPERLIPIDEMIA, UNSPECIFIED: ICD-10-CM

## 2025-05-01 DIAGNOSIS — R06.00 DYSPNEA, UNSPECIFIED: ICD-10-CM

## 2025-05-14 ENCOUNTER — LABORATORY RESULT (OUTPATIENT)
Age: 55
End: 2025-05-14

## 2025-05-14 ENCOUNTER — OUTPATIENT (OUTPATIENT)
Dept: OUTPATIENT SERVICES | Facility: HOSPITAL | Age: 55
LOS: 1 days | End: 2025-05-14
Payer: SELF-PAY

## 2025-05-14 DIAGNOSIS — Z00.00 ENCOUNTER FOR GENERAL ADULT MEDICAL EXAMINATION WITHOUT ABNORMAL FINDINGS: ICD-10-CM

## 2025-05-14 DIAGNOSIS — K08.109 COMPLETE LOSS OF TEETH, UNSPECIFIED CAUSE, UNSPECIFIED CLASS: ICD-10-CM

## 2025-05-14 PROCEDURE — 84443 ASSAY THYROID STIM HORMONE: CPT

## 2025-05-14 PROCEDURE — 84439 ASSAY OF FREE THYROXINE: CPT

## 2025-05-14 PROCEDURE — 85025 COMPLETE CBC W/AUTO DIFF WBC: CPT

## 2025-05-14 PROCEDURE — 83880 ASSAY OF NATRIURETIC PEPTIDE: CPT

## 2025-05-14 PROCEDURE — D2752: CPT

## 2025-05-14 PROCEDURE — 83036 HEMOGLOBIN GLYCOSYLATED A1C: CPT

## 2025-05-14 PROCEDURE — 80053 COMPREHEN METABOLIC PANEL: CPT

## 2025-05-14 PROCEDURE — 80061 LIPID PANEL: CPT

## 2025-05-15 DIAGNOSIS — K08.109 COMPLETE LOSS OF TEETH, UNSPECIFIED CAUSE, UNSPECIFIED CLASS: ICD-10-CM

## 2025-05-15 DIAGNOSIS — Z00.00 ENCOUNTER FOR GENERAL ADULT MEDICAL EXAMINATION WITHOUT ABNORMAL FINDINGS: ICD-10-CM

## 2025-05-21 ENCOUNTER — OUTPATIENT (OUTPATIENT)
Dept: OUTPATIENT SERVICES | Facility: HOSPITAL | Age: 55
LOS: 1 days | End: 2025-05-21
Payer: COMMERCIAL

## 2025-05-21 ENCOUNTER — APPOINTMENT (OUTPATIENT)
Dept: OPHTHALMOLOGY | Facility: CLINIC | Age: 55
End: 2025-05-21
Payer: COMMERCIAL

## 2025-05-21 DIAGNOSIS — H53.8 OTHER VISUAL DISTURBANCES: ICD-10-CM

## 2025-05-21 DIAGNOSIS — K02.9 DENTAL CARIES, UNSPECIFIED: ICD-10-CM

## 2025-05-21 PROCEDURE — D9110: CPT

## 2025-05-21 PROCEDURE — D0230: CPT

## 2025-05-21 PROCEDURE — 92134 CPTRZ OPH DX IMG PST SGM RTA: CPT

## 2025-05-21 PROCEDURE — 92014 COMPRE OPH EXAM EST PT 1/>: CPT

## 2025-05-21 PROCEDURE — 92202 OPSCPY EXTND ON/MAC DRAW: CPT

## 2025-05-21 PROCEDURE — 92134 CPTRZ OPH DX IMG PST SGM RTA: CPT | Mod: 26

## 2025-05-21 PROCEDURE — D0220: CPT

## 2025-05-28 ENCOUNTER — OUTPATIENT (OUTPATIENT)
Dept: OUTPATIENT SERVICES | Facility: HOSPITAL | Age: 55
LOS: 1 days | End: 2025-05-28
Payer: SELF-PAY

## 2025-05-28 DIAGNOSIS — K08.109 COMPLETE LOSS OF TEETH, UNSPECIFIED CAUSE, UNSPECIFIED CLASS: ICD-10-CM

## 2025-05-28 PROCEDURE — T1013: CPT

## 2025-05-28 PROCEDURE — D2752: CPT

## 2025-05-29 DIAGNOSIS — H25.13 AGE-RELATED NUCLEAR CATARACT, BILATERAL: ICD-10-CM

## 2025-05-29 DIAGNOSIS — Z01.20 ENCOUNTER FOR DENTAL EXAMINATION AND CLEANING WITHOUT ABNORMAL FINDINGS: ICD-10-CM

## 2025-05-29 DIAGNOSIS — H43.829 VITREOMACULAR ADHESION, UNSPECIFIED EYE: ICD-10-CM

## 2025-05-29 DIAGNOSIS — H35.013 CHANGES IN RETINAL VASCULAR APPEARANCE, BILATERAL: ICD-10-CM

## 2025-05-30 ENCOUNTER — APPOINTMENT (OUTPATIENT)
Dept: GASTROENTEROLOGY | Facility: CLINIC | Age: 55
End: 2025-05-30
Payer: COMMERCIAL

## 2025-05-30 ENCOUNTER — LABORATORY RESULT (OUTPATIENT)
Age: 55
End: 2025-05-30

## 2025-05-30 ENCOUNTER — OUTPATIENT (OUTPATIENT)
Dept: OUTPATIENT SERVICES | Facility: HOSPITAL | Age: 55
LOS: 1 days | End: 2025-05-30
Payer: COMMERCIAL

## 2025-05-30 VITALS
OXYGEN SATURATION: 99 % | HEIGHT: 67 IN | SYSTOLIC BLOOD PRESSURE: 126 MMHG | WEIGHT: 184 LBS | BODY MASS INDEX: 28.88 KG/M2 | DIASTOLIC BLOOD PRESSURE: 85 MMHG | HEART RATE: 65 BPM

## 2025-05-30 DIAGNOSIS — F10.90 ALCOHOL USE, UNSPECIFIED, UNCOMPLICATED: ICD-10-CM

## 2025-05-30 DIAGNOSIS — K63.5 POLYP OF COLON: ICD-10-CM

## 2025-05-30 DIAGNOSIS — R74.01 ELEVATION OF LEVELS OF LIVER TRANSAMINASE LEVELS: ICD-10-CM

## 2025-05-30 DIAGNOSIS — K21.9 GASTRO-ESOPHAGEAL REFLUX DISEASE W/OUT ESOPHAGITIS: ICD-10-CM

## 2025-05-30 DIAGNOSIS — K64.4 RESIDUAL HEMORRHOIDAL SKIN TAGS: ICD-10-CM

## 2025-05-30 DIAGNOSIS — Z00.00 ENCOUNTER FOR GENERAL ADULT MEDICAL EXAMINATION WITHOUT ABNORMAL FINDINGS: ICD-10-CM

## 2025-05-30 DIAGNOSIS — R10.9 UNSPECIFIED ABDOMINAL PAIN: ICD-10-CM

## 2025-05-30 DIAGNOSIS — R10.84 GENERALIZED ABDOMINAL PAIN: ICD-10-CM

## 2025-05-30 PROCEDURE — 99214 OFFICE O/P EST MOD 30 MIN: CPT

## 2025-05-30 PROCEDURE — G2211 COMPLEX E/M VISIT ADD ON: CPT

## 2025-06-01 LAB
HAV IGM SER QL: NONREACTIVE
HBV CORE IGG+IGM SER QL: NONREACTIVE
HBV CORE IGM SER QL: NONREACTIVE
HBV CORE IGM SER QL: NONREACTIVE
HBV SURFACE AB SER QL: NONREACTIVE
HBV SURFACE AG SER QL: NONREACTIVE
HBV SURFACE AG SER QL: NONREACTIVE
HCV AB SER QL: NONREACTIVE
HCV S/CO RATIO: 0.16 S/CO
HEPATITIS A IGG ANTIBODY: REACTIVE
MITOCHONDRIA AB SER IF-ACNC: NORMAL
SMOOTH MUSCLE AB SER QL IF: NORMAL

## 2025-06-02 ENCOUNTER — OUTPATIENT (OUTPATIENT)
Dept: OUTPATIENT SERVICES | Facility: HOSPITAL | Age: 55
LOS: 1 days | End: 2025-06-02
Payer: COMMERCIAL

## 2025-06-02 ENCOUNTER — APPOINTMENT (OUTPATIENT)
Age: 55
End: 2025-06-02
Payer: COMMERCIAL

## 2025-06-02 VITALS
TEMPERATURE: 98.8 F | HEIGHT: 67 IN | HEART RATE: 71 BPM | OXYGEN SATURATION: 93 % | WEIGHT: 184 LBS | SYSTOLIC BLOOD PRESSURE: 111 MMHG | DIASTOLIC BLOOD PRESSURE: 71 MMHG | BODY MASS INDEX: 28.88 KG/M2

## 2025-06-02 DIAGNOSIS — Z00.00 ENCOUNTER FOR GENERAL ADULT MEDICAL EXAMINATION WITHOUT ABNORMAL FINDINGS: ICD-10-CM

## 2025-06-02 DIAGNOSIS — N50.812 LEFT TESTICULAR PAIN: ICD-10-CM

## 2025-06-02 DIAGNOSIS — L29.1 PRURITUS SCROTI: ICD-10-CM

## 2025-06-02 DIAGNOSIS — Z12.5 ENCOUNTER FOR SCREENING FOR MALIGNANT NEOPLASM OF PROSTATE: ICD-10-CM

## 2025-06-02 DIAGNOSIS — N52.9 MALE ERECTILE DYSFUNCTION, UNSPECIFIED: ICD-10-CM

## 2025-06-02 DIAGNOSIS — N43.3 HYDROCELE, UNSPECIFIED: ICD-10-CM

## 2025-06-02 DIAGNOSIS — Z87.438 PERSONAL HISTORY OF OTHER DISEASES OF MALE GENITAL ORGANS: ICD-10-CM

## 2025-06-02 LAB
ANA SER IF-ACNC: NEGATIVE
DEPRECATED KAPPA LC FREE/LAMBDA SER: 0.77 RATIO
IGA SERPL-MCNC: 238 MG/DL
IGG SERPL-MCNC: 1098 MG/DL
IGM SERPL-MCNC: 88 MG/DL
KAPPA LC CSF-MCNC: 1.68 MG/DL
KAPPA LC SERPL-MCNC: 1.29 MG/DL

## 2025-06-02 PROCEDURE — G2211 COMPLEX E/M VISIT ADD ON: CPT

## 2025-06-02 PROCEDURE — 99214 OFFICE O/P EST MOD 30 MIN: CPT

## 2025-06-02 PROCEDURE — T1013: CPT

## 2025-06-05 DIAGNOSIS — N43.3 HYDROCELE, UNSPECIFIED: ICD-10-CM

## 2025-06-05 DIAGNOSIS — L29.1 PRURITUS SCROTI: ICD-10-CM

## 2025-06-05 DIAGNOSIS — N52.9 MALE ERECTILE DYSFUNCTION, UNSPECIFIED: ICD-10-CM

## 2025-06-05 DIAGNOSIS — N50.812 LEFT TESTICULAR PAIN: ICD-10-CM

## 2025-06-05 DIAGNOSIS — Z87.438 PERSONAL HISTORY OF OTHER DISEASES OF MALE GENITAL ORGANS: ICD-10-CM

## 2025-06-09 ENCOUNTER — OUTPATIENT (OUTPATIENT)
Dept: OUTPATIENT SERVICES | Facility: HOSPITAL | Age: 55
LOS: 1 days | End: 2025-06-09
Payer: COMMERCIAL

## 2025-06-09 ENCOUNTER — RESULT REVIEW (OUTPATIENT)
Age: 55
End: 2025-06-09

## 2025-06-09 DIAGNOSIS — R74.01 ELEVATION OF LEVELS OF LIVER TRANSAMINASE LEVELS: ICD-10-CM

## 2025-06-09 PROCEDURE — 76705 ECHO EXAM OF ABDOMEN: CPT

## 2025-06-09 PROCEDURE — 76705 ECHO EXAM OF ABDOMEN: CPT | Mod: 26

## 2025-06-10 DIAGNOSIS — R74.01 ELEVATION OF LEVELS OF LIVER TRANSAMINASE LEVELS: ICD-10-CM

## 2025-06-12 DIAGNOSIS — K63.5 POLYP OF COLON: ICD-10-CM

## 2025-06-12 DIAGNOSIS — R10.84 GENERALIZED ABDOMINAL PAIN: ICD-10-CM

## 2025-06-12 DIAGNOSIS — R74.01 ELEVATION OF LEVELS OF LIVER TRANSAMINASE LEVELS: ICD-10-CM

## 2025-06-12 DIAGNOSIS — K21.9 GASTRO-ESOPHAGEAL REFLUX DISEASE WITHOUT ESOPHAGITIS: ICD-10-CM

## 2025-06-12 DIAGNOSIS — F10.90 ALCOHOL USE, UNSPECIFIED, UNCOMPLICATED: ICD-10-CM

## 2025-06-12 DIAGNOSIS — K64.4 RESIDUAL HEMORRHOIDAL SKIN TAGS: ICD-10-CM

## 2025-06-23 ENCOUNTER — EMERGENCY (EMERGENCY)
Facility: HOSPITAL | Age: 55
LOS: 0 days | Discharge: HOME | End: 2025-06-23
Attending: EMERGENCY MEDICINE
Payer: MEDICAID

## 2025-06-23 VITALS
HEART RATE: 74 BPM | DIASTOLIC BLOOD PRESSURE: 93 MMHG | WEIGHT: 190.04 LBS | HEIGHT: 66 IN | OXYGEN SATURATION: 99 % | TEMPERATURE: 98 F | SYSTOLIC BLOOD PRESSURE: 151 MMHG | RESPIRATION RATE: 18 BRPM

## 2025-06-23 DIAGNOSIS — K08.89 OTHER SPECIFIED DISORDERS OF TEETH AND SUPPORTING STRUCTURES: ICD-10-CM

## 2025-06-23 DIAGNOSIS — I10 ESSENTIAL (PRIMARY) HYPERTENSION: ICD-10-CM

## 2025-06-23 PROCEDURE — 99284 EMERGENCY DEPT VISIT MOD MDM: CPT

## 2025-06-23 PROCEDURE — 99283 EMERGENCY DEPT VISIT LOW MDM: CPT | Mod: 25

## 2025-06-23 PROCEDURE — 96372 THER/PROPH/DIAG INJ SC/IM: CPT

## 2025-06-23 RX ORDER — KETOROLAC TROMETHAMINE 30 MG/ML
60 INJECTION, SOLUTION INTRAMUSCULAR; INTRAVENOUS ONCE
Refills: 0 | Status: DISCONTINUED | OUTPATIENT
Start: 2025-06-23 | End: 2025-06-23

## 2025-06-23 RX ORDER — KETOROLAC TROMETHAMINE 30 MG/ML
1 INJECTION, SOLUTION INTRAMUSCULAR; INTRAVENOUS
Qty: 15 | Refills: 0
Start: 2025-06-23 | End: 2025-06-27

## 2025-06-23 RX ORDER — AMOXICILLIN 500 MG/1
1 CAPSULE ORAL
Qty: 14 | Refills: 0
Start: 2025-06-23 | End: 2025-06-29

## 2025-06-23 RX ADMIN — KETOROLAC TROMETHAMINE 60 MILLIGRAM(S): 30 INJECTION, SOLUTION INTRAMUSCULAR; INTRAVENOUS at 16:53

## 2025-06-23 NOTE — ED PROVIDER NOTE - PATIENT PORTAL LINK FT
You can access the FollowMyHealth Patient Portal offered by [facility] by registering at the following website: [email or web address]. By joining [employer]’s FollowMyHealth portal, you will also be able to view your health information using other applications (apps) compatible with our system.

## 2025-06-23 NOTE — ED ADULT TRIAGE NOTE - IDEAL BODY WEIGHT(KG)
Medication Therapy Management (MTM) Encounter    ASSESSMENT:                            Medication Adherence/Access: No issues identified    Psoriatic Arthritis: Patient doing well with minimal psoriatic arthritis symptoms but due to recent labs indicating no Humira activity, would benefit from changing biologics. Recommend Enbrel 50 mg weekly in conjunction with Otezla 30 mg twice daily. Provided education on Enbrel today including dosing, general administration, side effects (both common/serious), precautions, monitoring and time to efficacy. Discussed data on malignancy and risk of serious infection in depth. Encouraged indicated non-live vaccines and avoidance of live vaccines. Discussed potential need to hold therapy in the setting of signs/symptoms of active infection. Encouraged her to contact the rheumatology clinic in the event she has questions on this. Would benefit from starting Enbrel at least 2 weeks after the last Humira dose and using weekly as directed.    Vaccines: Per ACIP recommendations, eligible for Shingrix series, Prevnar 20 and bivalent covid booster.    GERD: Stable.    Allergic Rhinitis: Stable.    Supplements: Stable.    PLAN:                            1. We are working on obtaining insurance coverage for Enbrel. Once this is approved the pharmacy will call you to set up delivery so you can start weekly injections. Start Enbrel 50 mg weekly at least 2 weeks after your last Humira dose.    2. Continue Otezla 30 mg twice daily.    3 . A common side effect of Enbrel is injection site reactions (red, raised, itchy spot at injection site). You can use hydrocortisone cream and ice to treat these reactions if they occur.    4. Vaccine recommendations: Shingrix series, Prevnar 20, bivalent covid booster    Follow-up: Return in about 3 months (around 10/28/2023) for MTM Pharmacist Visit.    SUBJECTIVE/OBJECTIVE:                          Alicia Simth is a 40 year old female called for an initial  visit. She was referred to me from Radha Gong NP.      Reason for visit: Humira no longer effective per labs, need to change biologic therapy    Allergies/ADRs: Reviewed in chart  Past Medical History: Reviewed in chart  Tobacco: She reports that she has never smoked. She has never used smokeless tobacco.  Alcohol: Less than 1 beverage / month - does tend to drink more alcohol in summer [place]    Medication Adherence/Access: no issues reported    Psoriatic Arthritis:   Humira 40 mg every other week  Otezla 30 mg twice daily   Tylenol 500 mg as needed   Ibuprofen 200 mg as needed     Reports her joint symptoms are well controlled on Humira, has minor psoriasis patches but does not find these particularly bothersome. Completed labs last week that showed antibodies against Humira - rheumatologist requested she switch biologic therapy due to this. No side effects from current medications noted.    Liver Function Studies -   Recent Labs   Lab Test 06/24/23  0953 03/09/23  0739 02/10/23  1602   PROTTOTAL  --   --  7.8   ALBUMIN 3.9   < > 4.1   BILITOTAL  --   --  0.2   ALKPHOS  --   --  97   AST 20   < > 26   ALT 22   < > 28    < > = values in this interval not displayed.     CBC RESULTS:   Recent Labs   Lab Test 06/24/23  0953   WBC 10.2   RBC 4.44   HGB 13.4   HCT 40.5   MCV 91   MCH 30.2   MCHC 33.1   RDW 13.3            Vaccines: Due for Shingrix series, Prevnar 20 and bivalent covid booster.  Immunization History   Administered Date(s) Administered    COVID-19 Bivalent 18+ (Moderna) 10/11/2022    COVID-19 MONOVALENT 12+ (Pfizer) 01/15/2021, 02/05/2021    COVID-19 Monovalent 18+ (Moderna) 12/21/2021    FLU 6-35 months 10/10/2012, 11/05/2013, 09/22/2016    Flu, Unspecified 09/01/2017, 09/15/2019, 10/27/2022    Influenza (IIV3) PF 10/08/2010, 09/21/2011, 09/29/2015, 10/12/2021    Influenza Vaccine >6 months (Alfuria,Fluzone) 09/19/2017, 10/11/2019, 09/14/2020    TDAP (Adacel,Boostrix)  09/14/2020     GERD:   Omeprazole 20 mg twice daily   Patient feels that current regimen is effective. No side effects or concerns noted.    Allergic Rhinitis:   Loratadine 10 mg once daily  Patient reports no current medication side effects.   Patient feels that current therapy is effective.     Supplements:   Multivitamin daily  Vitamin D 2000 units daily  No reported issues at this time.     Today's Vitals: There were no vitals taken for this visit.  ----------------    I spent 21 minutes with this patient today. All changes were made via collaborative practice agreement with Radha Gong A copy of the visit note was provided to the patient's provider(s).    A summary of these recommendations was sent via LOAG.    Debbie Bedolla, PharmD  Medication Therapy Management Pharmacist  [facility]  Phone: [phone]    Telemedicine Visit Details  Type of service:  Telephone visit  Start Time: 9:00 AM  End Time: 9:21 AM     Medication Therapy Recommendations  Psoriatic arthritis (H)    Current Medication: adalimumab (HUMIRA *CF*) 40 MG/0.4ML pen kit (Discontinued)   Rationale: More effective medication available - Ineffective medication - Effectiveness   Recommendation: Change Medication - ENBREL SURECLICK SC - 50 mg weekly   Status: Accepted per CPA          Current Medication: etanercept (ENBREL SURECLICK) 50 MG/ML autoinjector   Rationale: Does not understand instructions - Adherence - Adherence   Recommendation: Provide Education   Status: Patient Agreed - Adherence/Education         Vaccine counseling    Rationale: Preventive therapy - Needs additional medication therapy - Indication   Recommendation: Start Medication - Shingrix 50 MCG/0.5ML Susr   Status: Accepted - no CPA Needed               66

## 2025-06-23 NOTE — ED ADULT NURSE NOTE - HIV OFFER
Previously Declined (within the last year) [Initial Evaluation] : an initial evaluation [FreeTextEntry1] : adrenal adenoma, hypertension

## 2025-06-23 NOTE — ED PROVIDER NOTE - CLINICAL SUMMARY MEDICAL DECISION MAKING FREE TEXT BOX
Patient presented with tooth pain as documented. Otherwise afebrile, HD stable, tolerating PO at home, airway patent, clearing secretions without difficulty, speaking full sentences, no tongue elevation or cervical LAD, uvula midline. No signs of abscess on exam. Given toradol in ED with improvement of pain. Given the above, will discharge home with outpatient dental follow up. Patient agreeable with plan. Agrees to return to ED for any new or worsening symptoms.

## 2025-06-23 NOTE — ED PROVIDER NOTE - PHYSICAL EXAMINATION
VITAL SIGNS: I have reviewed nursing notes and confirm.  CONSTITUTIONAL: Well-developed; well-nourished  SKIN: skin exam is warm and dry, no acute rash.    HEAD: Normocephalic; atraumatic.  EYES: conjunctiva and sclera clear.  ENT: ttp tooth #28, no sublingual edema, speaking full sentences No nasal discharge; airway clear..  EXT: Normal ROM.  No clubbing, cyanosis or edema.   NEURO: Alert, oriented, grossly unremarkable

## 2025-06-23 NOTE — ED ADULT NURSE NOTE - IN ACCORDANCE WITH [place] LAW, WE OFFER EVERY PATIENT A HEPATITIS C TEST. WOULD YOU LIKE TO BE TESTED TODAY?
Opt out Posterior Auricular Interpolation Flap Text: A decision was made to reconstruct the defect utilizing an interpolation axial flap and a staged reconstruction.  A telfa template was made of the defect.  This telfa template was then used to outline the posterior auricular interpolation flap.  The donor area for the pedicle flap was then injected with anesthesia.  The flap was excised through the skin and subcutaneous tissue down to the layer of the underlying musculature.  The pedicle flap was carefully excised within this deep plane to maintain its blood supply.  The edges of the donor site were undermined.   The donor site was closed in a primary fashion.  The pedicle was then rotated into position and sutured.  Once the tube was sutured into place, adequate blood supply was confirmed with blanching and refill.  The pedicle was then wrapped with xeroform gauze and dressed appropriately with a telfa and gauze bandage to ensure continued blood supply and protect the attached pedicle.

## 2025-06-23 NOTE — ED PROVIDER NOTE - OBJECTIVE STATEMENT
Patient is a 55-year-old male history of hypertension here for evaluation of right lower dental pain for the past 4 days been constant.  Patient went to see his dentist today but power went out which prompted visit.  Patient denies fever, chills, trouble swallowing

## 2025-06-25 ENCOUNTER — OUTPATIENT (OUTPATIENT)
Dept: OUTPATIENT SERVICES | Facility: HOSPITAL | Age: 55
LOS: 1 days | End: 2025-06-25
Payer: COMMERCIAL

## 2025-06-25 DIAGNOSIS — K02.9 DENTAL CARIES, UNSPECIFIED: ICD-10-CM

## 2025-06-25 DIAGNOSIS — K03.81 CRACKED TOOTH: ICD-10-CM

## 2025-06-25 PROCEDURE — D0140: CPT

## 2025-06-25 PROCEDURE — D9110: CPT

## 2025-06-25 PROCEDURE — D0220: CPT

## 2025-07-18 ENCOUNTER — OUTPATIENT (OUTPATIENT)
Dept: OUTPATIENT SERVICES | Facility: HOSPITAL | Age: 55
LOS: 1 days | End: 2025-07-18

## 2025-07-18 DIAGNOSIS — K02.9 DENTAL CARIES, UNSPECIFIED: ICD-10-CM

## 2025-07-31 ENCOUNTER — RX RENEWAL (OUTPATIENT)
Age: 55
End: 2025-07-31